# Patient Record
Sex: FEMALE | Race: WHITE | HISPANIC OR LATINO | Employment: UNEMPLOYED | URBAN - METROPOLITAN AREA
[De-identification: names, ages, dates, MRNs, and addresses within clinical notes are randomized per-mention and may not be internally consistent; named-entity substitution may affect disease eponyms.]

---

## 2022-08-10 ENCOUNTER — OFFICE VISIT (OUTPATIENT)
Dept: INTERNAL MEDICINE CLINIC | Facility: CLINIC | Age: 52
End: 2022-08-10
Payer: COMMERCIAL

## 2022-08-10 VITALS
SYSTOLIC BLOOD PRESSURE: 134 MMHG | WEIGHT: 204 LBS | HEIGHT: 62 IN | BODY MASS INDEX: 37.54 KG/M2 | HEART RATE: 88 BPM | OXYGEN SATURATION: 98 % | TEMPERATURE: 98.8 F | DIASTOLIC BLOOD PRESSURE: 80 MMHG | RESPIRATION RATE: 16 BRPM

## 2022-08-10 DIAGNOSIS — Z80.8 FAMILY HX OF MELANOMA: ICD-10-CM

## 2022-08-10 DIAGNOSIS — I10 PRIMARY HYPERTENSION: ICD-10-CM

## 2022-08-10 DIAGNOSIS — Z00.00 ANNUAL PHYSICAL EXAM: Primary | ICD-10-CM

## 2022-08-10 DIAGNOSIS — E78.2 MIXED HYPERLIPIDEMIA: ICD-10-CM

## 2022-08-10 DIAGNOSIS — F41.9 ANXIETY AND DEPRESSION: ICD-10-CM

## 2022-08-10 DIAGNOSIS — F17.200 TOBACCO USE DISORDER: ICD-10-CM

## 2022-08-10 DIAGNOSIS — K21.9 GASTROESOPHAGEAL REFLUX DISEASE, UNSPECIFIED WHETHER ESOPHAGITIS PRESENT: ICD-10-CM

## 2022-08-10 DIAGNOSIS — F32.A ANXIETY AND DEPRESSION: ICD-10-CM

## 2022-08-10 DIAGNOSIS — G54.2 CERVICAL NERVE ROOT IMPINGEMENT: ICD-10-CM

## 2022-08-10 PROCEDURE — 99386 PREV VISIT NEW AGE 40-64: CPT | Performed by: FAMILY MEDICINE

## 2022-08-10 PROCEDURE — 99406 BEHAV CHNG SMOKING 3-10 MIN: CPT | Performed by: FAMILY MEDICINE

## 2022-08-10 RX ORDER — CYCLOBENZAPRINE HCL 5 MG
TABLET ORAL
COMMUNITY
Start: 2022-06-16

## 2022-08-10 RX ORDER — NITROFURANTOIN 25; 75 MG/1; MG/1
100 CAPSULE ORAL EVERY 12 HOURS
COMMUNITY
Start: 2022-08-10 | End: 2022-08-15

## 2022-08-10 RX ORDER — HYDROCHLOROTHIAZIDE 25 MG/1
25 TABLET ORAL DAILY
COMMUNITY
Start: 2022-05-14 | End: 2022-08-10 | Stop reason: SDUPTHER

## 2022-08-10 RX ORDER — SIMVASTATIN 20 MG
20 TABLET ORAL EVERY EVENING
COMMUNITY
Start: 2022-05-14 | End: 2022-08-10 | Stop reason: SDUPTHER

## 2022-08-10 RX ORDER — SIMVASTATIN 20 MG
20 TABLET ORAL EVERY EVENING
Qty: 90 TABLET | Refills: 0 | Status: SHIPPED | OUTPATIENT
Start: 2022-08-10 | End: 2022-11-08

## 2022-08-10 RX ORDER — LOSARTAN POTASSIUM 25 MG/1
25 TABLET ORAL DAILY
COMMUNITY
Start: 2022-05-14 | End: 2022-08-10 | Stop reason: SDUPTHER

## 2022-08-10 RX ORDER — PANTOPRAZOLE SODIUM 40 MG/1
40 TABLET, DELAYED RELEASE ORAL DAILY
Qty: 90 TABLET | Refills: 0 | Status: SHIPPED | OUTPATIENT
Start: 2022-08-10 | End: 2022-11-08

## 2022-08-10 RX ORDER — HYDROCHLOROTHIAZIDE 25 MG/1
25 TABLET ORAL DAILY
Qty: 90 TABLET | Refills: 0 | Status: SHIPPED | OUTPATIENT
Start: 2022-08-10 | End: 2022-11-08

## 2022-08-10 RX ORDER — PANTOPRAZOLE SODIUM 40 MG/1
40 TABLET, DELAYED RELEASE ORAL DAILY
COMMUNITY
Start: 2022-05-14 | End: 2022-08-10 | Stop reason: SDUPTHER

## 2022-08-10 RX ORDER — LOSARTAN POTASSIUM 25 MG/1
25 TABLET ORAL DAILY
Qty: 90 TABLET | Refills: 0 | Status: SHIPPED | OUTPATIENT
Start: 2022-08-10 | End: 2022-11-08

## 2022-08-10 RX ORDER — ESCITALOPRAM OXALATE 20 MG/1
20 TABLET ORAL DAILY
COMMUNITY
Start: 2022-05-14 | End: 2022-08-10 | Stop reason: SDUPTHER

## 2022-08-10 RX ORDER — GABAPENTIN 300 MG/1
300 CAPSULE ORAL
Qty: 4 CAPSULE | Refills: 0 | Status: SHIPPED | OUTPATIENT
Start: 2022-08-10

## 2022-08-10 RX ORDER — ESTRADIOL 2 MG/1
2 TABLET ORAL DAILY
COMMUNITY
Start: 2022-05-14

## 2022-08-10 RX ORDER — ESCITALOPRAM OXALATE 20 MG/1
20 TABLET ORAL DAILY
Qty: 90 TABLET | Refills: 0 | Status: SHIPPED | OUTPATIENT
Start: 2022-08-10 | End: 2022-11-08

## 2022-08-10 NOTE — PATIENT INSTRUCTIONS

## 2022-08-10 NOTE — PROGRESS NOTES
ADULT ANNUAL [address]     NAME: Vlad Lopez  AGE: 46 y o  SEX: female  : 1970     DATE: 8/10/2022     Assessment and Plan:     Problem List Items Addressed This Visit        Digestive    Gastroesophageal reflux disease    Relevant Medications    pantoprazole (PROTONIX) 40 mg tablet       Cardiovascular and Mediastinum    Primary hypertension    Relevant Medications    hydrochlorothiazide (HYDRODIURIL) 25 mg tablet    losartan (COZAAR) 25 mg tablet       Nervous and Auditory    Cervical nerve root impingement    Relevant Medications    gabapentin (Neurontin) 300 mg capsule       Other    Anxiety and depression    Relevant Medications    escitalopram (LEXAPRO) 20 mg tablet    Mixed hyperlipidemia    Relevant Medications    simvastatin (ZOCOR) 20 mg tablet    Tobacco use disorder      Other Visit Diagnoses     Annual physical exam    -  Primary    Family hx of melanoma        Relevant Orders    Ambulatory Referral to Dermatology      47 yo female with cervical nerve impingment  Has upcoming surgery  Has had labs completed  Pt will forward to this office  Never tried analgesia for neuropathic pain  Was taking excessive NSAIDS  Will trial gabapentin for a few days  Refills of medications provided  After recovering form surgery pt will follow up to discuss mammograms and colon cancer screening  Will establish with derm after surgery for annual skin cancer screening  Immunizations and preventive care screenings were discussed with patient today  Appropriate education was printed on patient's after visit summary  Counseling:  Dental Health: discussed importance of regular tooth brushing, flossing, and dental visits  Injury prevention: discussed safety/seat belts, safety helmets, smoke detectors, carbon dioxide detectors, and smoking near bedding or upholstery    · Exercise: the importance of regular exercise/physical activity was discussed  Recommend exercise 3-5 times per week for at least 30 minutes  BMI Counseling: Body mass index is 37 31 kg/m²  The BMI is above normal  Nutrition recommendations include encouraging healthy choices of fruits and vegetables, limiting drinks that contain sugar and reducing intake of cholesterol  Exercise recommendations include exercising 3-5 times per week  Rationale for BMI follow-up plan is due to patient being overweight or obese  Depression Screening and Follow-up Plan: Patient was screened for depression during today's encounter  They screened negative with a PHQ-2 score of 2  Tobacco Cessation Counseling: Tobacco cessation counseling was provided  The patient is sincerely urged to quit consumption of tobacco  She is ready to quit tobacco  Pt would like to continue slow weaning without medication  3 minutes of encounter spent providing smoking cessation counseling  Return in about 6 months (around 2/10/2023) for Next scheduled follow up  Chief Complaint:     Chief Complaint   Patient presents with    Establish Care      History of Present Illness:     Adult Annual Physical   Patient here for a comprehensive physical exam   Last seen previous PCP in [place] a few year k[place]  Has cervical radiculopathy  Is scheduled for surgery this upcoming Monday  Hx of hld, htn, anxiety and depression and GERD  Works on a factory line  But currently out on workman's comp  Lives with significant other    Had blood work done few days ago  1st cousin with breast cancer  Denies family of colon cancer   Currently daily smoker  >30 years  Cutting down to a quarter of a pack a day  Diet and Physical Activity  · Diet/Nutrition: limited junk food  · Exercise: walking        Depression Screening  PHQ-2/9 Depression Screening    Little interest or pleasure in doing things: 0 - not at all  Feeling down, depressed, or hopeless: 2 - more than half the days  PHQ-2 Score: 2  PHQ-2 Interpretation: Negative depression screen       General Health  · Sleep: gets 4-6 hours of sleep on average  · Hearing: normal - bilateral   · Vision: wears glasses  · Dental: regular dental visits  /GYN Health  · Patient is: postmenopausal  · LAST PAP- YEARS  · LAST COLO-YEARS   · LAST MAMMO     Review of Systems:     Review of Systems   Constitutional: Negative for fever  Respiratory: Negative for shortness of breath  Cardiovascular: Negative for chest pain  Gastrointestinal: Positive for constipation  Negative for diarrhea  Genitourinary: Negative for difficulty urinating  Musculoskeletal: Positive for neck pain  Neurological: Positive for weakness (upper ext b/l ) and headaches (tension headache )  Psychiatric/Behavioral: The patient is nervous/anxious  Past Medical History:     Past Medical History:   Diagnosis Date    Anxiety     Depression     GERD (gastroesophageal reflux disease)     Hypertension     Urinary tract infection       Past Surgical History:     Past Surgical History:   Procedure Laterality Date     SECTION      FRACTURE SURGERY      HYSTERECTOMY        Social History:     Social History     Socioeconomic History    Marital status:       Spouse name: None    Number of children: None    Years of education: None    Highest education level: None   Occupational History    None   Tobacco Use    Smoking status: Current Every Day Smoker     Packs/day: 0 25     Years: 30 00     Pack years: 7 50     Types: Cigarettes    Smokeless tobacco: Never Used   Substance and Sexual Activity    Alcohol use: Not Currently    Drug use: None    Sexual activity: None   Other Topics Concern    None   Social History Narrative    None     Social Determinants of Health     Financial Resource Strain: Not on file   Food Insecurity: Not on file   Transportation Needs: Not on file   Physical Activity: Unknown    Days of Exercise per Week: 0 days   [employer] Exercise per Session: Not on file   Stress: Not on file   Social Connections: Not on file   Intimate Partner Violence: Not on file   Housing Stability: Not on file      Family History:     Family History   Problem Relation Age of Onset    Heart disease Mother     Cervical cancer Mother     Diabetes type II Father     Cancer Father     Hypertension Brother     Hyperlipidemia Brother       Current Medications:     Current Outpatient Medications   Medication Sig Dispense Refill    escitalopram (LEXAPRO) 20 mg tablet Take 1 tablet (20 mg total) by mouth daily As directed 90 tablet 0    gabapentin (Neurontin) 300 mg capsule Take 1 capsule (300 mg total) by mouth daily at bedtime 4 capsule 0    hydrochlorothiazide (HYDRODIURIL) 25 mg tablet Take 1 tablet (25 mg total) by mouth daily 90 tablet 0    losartan (COZAAR) 25 mg tablet Take 1 tablet (25 mg total) by mouth daily 90 tablet 0    nitrofurantoin (MACROBID) 100 mg capsule Take 100 mg by mouth every 12 (twelve) hours      pantoprazole (PROTONIX) 40 mg tablet Take 1 tablet (40 mg total) by mouth daily 90 tablet 0    simvastatin (ZOCOR) 20 mg tablet Take 1 tablet (20 mg total) by mouth every evening 90 tablet 0    cyclobenzaprine (FLEXERIL) 5 mg tablet TAKE 1 TAB EVERY 8 HOURS AS NEEDED FOR MUSCLE SPASMS      estradiol (ESTRACE) 2 MG tablet Take 2 mg by mouth daily       No current facility-administered medications for this visit  Allergies:     No Known Allergies   Physical Exam:     /80 (BP Location: Left arm, Patient Position: Sitting, Cuff Size: Large)   Pulse 88   Temp 98 8 °F (37 1 °C) (Tympanic)   Resp 16   Ht 5' 2" (1 575 m)   Wt 92 5 kg (204 lb)   SpO2 98%   BMI 37 31 kg/m²     Physical Exam  HENT:      Head: Normocephalic and atraumatic        Right Ear: Tympanic membrane and external ear normal       Left Ear: Tympanic membrane and external ear normal       Mouth/Throat:      Mouth: Mucous membranes are moist       Pharynx: Oropharynx is clear  Eyes:      Extraocular Movements: Extraocular movements intact  Conjunctiva/sclera: Conjunctivae normal       Pupils: Pupils are equal, round, and reactive to light  Cardiovascular:      Rate and Rhythm: Normal rate and regular rhythm  Heart sounds: No murmur heard  Pulmonary:      Effort: Pulmonary effort is normal       Breath sounds: Normal breath sounds  Abdominal:      General: Bowel sounds are normal       Palpations: Abdomen is soft  Musculoskeletal:      Right shoulder: No bony tenderness  Decreased range of motion  Decreased strength  Left shoulder: No bony tenderness  Decreased range of motion  Decreased strength  Neurological:      Mental Status: She is alert and oriented to person, place, and time        Gait: Gait normal    Psychiatric:         Mood and Affect: Mood normal          Behavior: Behavior normal           Zoila Holm,   MEDICAL [address]

## 2022-11-21 DIAGNOSIS — I10 PRIMARY HYPERTENSION: ICD-10-CM

## 2022-11-21 RX ORDER — LOSARTAN POTASSIUM 25 MG/1
TABLET ORAL
Qty: 90 TABLET | Refills: 0 | Status: SHIPPED | OUTPATIENT
Start: 2022-11-21

## 2022-11-22 ENCOUNTER — OFFICE VISIT (OUTPATIENT)
Dept: INTERNAL MEDICINE CLINIC | Facility: CLINIC | Age: 52
End: 2022-11-22

## 2022-11-22 VITALS
SYSTOLIC BLOOD PRESSURE: 110 MMHG | WEIGHT: 216.2 LBS | BODY MASS INDEX: 39.54 KG/M2 | RESPIRATION RATE: 18 BRPM | DIASTOLIC BLOOD PRESSURE: 62 MMHG | TEMPERATURE: 97.6 F | HEART RATE: 86 BPM | OXYGEN SATURATION: 97 %

## 2022-11-22 DIAGNOSIS — Z12.11 COLON CANCER SCREENING: ICD-10-CM

## 2022-11-22 DIAGNOSIS — G54.2 CERVICAL NERVE ROOT IMPINGEMENT: Primary | ICD-10-CM

## 2022-11-22 DIAGNOSIS — R30.0 DYSURIA: ICD-10-CM

## 2022-11-22 DIAGNOSIS — R53.83 LOW ENERGY: ICD-10-CM

## 2022-11-22 DIAGNOSIS — E78.5 DYSLIPIDEMIA: ICD-10-CM

## 2022-11-22 DIAGNOSIS — Z98.890 H/O CERVICAL SPINE SURGERY: ICD-10-CM

## 2022-11-22 DIAGNOSIS — Z12.31 ENCOUNTER FOR SCREENING MAMMOGRAM FOR BREAST CANCER: ICD-10-CM

## 2022-11-22 DIAGNOSIS — Z11.59 NEED FOR HEPATITIS C SCREENING TEST: ICD-10-CM

## 2022-11-22 DIAGNOSIS — K21.9 GASTROESOPHAGEAL REFLUX DISEASE, UNSPECIFIED WHETHER ESOPHAGITIS PRESENT: ICD-10-CM

## 2022-11-22 RX ORDER — OMEPRAZOLE 40 MG/1
40 CAPSULE, DELAYED RELEASE ORAL DAILY
Qty: 90 CAPSULE | Refills: 0 | Status: SHIPPED | OUTPATIENT
Start: 2022-11-22

## 2022-11-22 NOTE — PROGRESS NOTES
Name: Stephanie Kincaid      : 1970      MRN: [id number]  Encounter Provider: Enmanuel Youssef DO  Encounter Date: 2022   Encounter department: [facility]  [address]     1  Cervical nerve root impingement  2  H/O cervical spine surgery-pt to continue to follow with surgeon and start PT  3  Encounter for screening mammogram for breast cancer  -     Mammo screening bilateral w 3d & cad; Future; Expected date: 2022    4  Colon cancer screening  -     Ambulatory Referral to Gastroenterology; Future    5  Gastroesophageal reflux disease, unspecified whether esophagitis present-refractory to omeprazole 20mg  Will increase to 40mg and pt to continue tums prn  Will f/u with GI for possible EDG due to refractory symptoms    -     Ambulatory Referral to Gastroenterology; Future  -     omeprazole (PriLOSEC) 40 MG capsule; Take 1 capsule (40 mg total) by mouth daily    6  Dysuria  -     UA w Reflex to Microscopic w Reflex to Culture -Lab Collect; Future; Expected date: 2022    7  Low energy  -     Vitamin D 25 hydroxy; Future    8  Dyslipidemia- on zocor  Pt to continue   -     Lipid Panel with Direct LDL reflex; Future    9  Need for hepatitis C screening test  -     Hepatitis C Antibody ([facility]); Future           Subjective      gerd refractory to ppi  Seen in er for chest pain  ACS and PE were ruled out  Since pt has been using lots of tums which provides moderate, temporary relief  Admits to poor diet  especially since cervical spine surgery and since quitting tobacco use  Has followed up with neurosurgeon today  Next step is to participate  in PT  Still has pain and radiating     Review of Systems   Constitutional: Negative for fatigue  Respiratory: Negative for shortness of breath  Cardiovascular: Negative for chest pain  Gastrointestinal: Positive for abdominal pain  Musculoskeletal: Positive for neck stiffness   Negative for gait problem  Neurological: Positive for numbness  Current Outpatient Medications on File Prior to Visit   Medication Sig   • escitalopram (LEXAPRO) 20 mg tablet Take 1 tablet (20 mg total) by mouth daily As directed   • estradiol (ESTRACE) 2 MG tablet Take 2 mg by mouth daily   • hydrochlorothiazide (HYDRODIURIL) 25 mg tablet Take 1 tablet (25 mg total) by mouth daily   • losartan (COZAAR) 25 mg tablet TAKE 1 TABLET BY MOUTH EVERY DAY   • simvastatin (ZOCOR) 20 mg tablet Take 1 tablet (20 mg total) by mouth every evening   • cyclobenzaprine (FLEXERIL) 5 mg tablet TAKE 1 TAB EVERY 8 HOURS AS NEEDED FOR MUSCLE SPASMS (Patient not taking: Reported on 11/22/2022)   • gabapentin (Neurontin) 300 mg capsule Take 1 capsule (300 mg total) by mouth daily at bedtime (Patient not taking: Reported on 11/22/2022)       Objective     /62 (BP Location: Left arm, Patient Position: Sitting, Cuff Size: Standard)   Pulse 86   Temp 97 6 °F (36 4 °C) (Temporal)   Resp 18   Wt 98 1 kg (216 lb 3 2 oz)   SpO2 97%   BMI 39 54 kg/m²     Physical Exam  HENT:      Head: Normocephalic and atraumatic  Cardiovascular:      Rate and Rhythm: Normal rate and regular rhythm  Heart sounds: No murmur heard  Pulmonary:      Effort: Pulmonary effort is normal       Breath sounds: Normal breath sounds  Abdominal:      General: Bowel sounds are normal       Palpations: Abdomen is soft  Neurological:      Mental Status: She is alert and oriented to person, place, and time         Tim Burt DO

## 2022-11-22 NOTE — PATIENT INSTRUCTIONS
Make an appointment with the gastroenterologist    Start taking omeprazole and linden for the heartburn   Schedule your mammogram   Get your blood work completed

## 2023-02-05 DIAGNOSIS — I10 PRIMARY HYPERTENSION: ICD-10-CM

## 2023-02-05 RX ORDER — HYDROCHLOROTHIAZIDE 25 MG/1
25 TABLET ORAL DAILY
Qty: 90 TABLET | Refills: 0 | Status: SHIPPED | OUTPATIENT
Start: 2023-02-05 | End: 2023-05-06

## 2023-03-01 DIAGNOSIS — I10 PRIMARY HYPERTENSION: ICD-10-CM

## 2023-03-01 RX ORDER — LOSARTAN POTASSIUM 25 MG/1
TABLET ORAL
Qty: 90 TABLET | Refills: 0 | Status: SHIPPED | OUTPATIENT
Start: 2023-03-01

## 2023-03-02 DIAGNOSIS — K21.9 GASTROESOPHAGEAL REFLUX DISEASE, UNSPECIFIED WHETHER ESOPHAGITIS PRESENT: ICD-10-CM

## 2023-03-02 RX ORDER — OMEPRAZOLE 40 MG/1
40 CAPSULE, DELAYED RELEASE ORAL DAILY
Qty: 90 CAPSULE | Refills: 0 | Status: SHIPPED | OUTPATIENT
Start: 2023-03-02

## 2023-03-18 DIAGNOSIS — K21.9 GASTROESOPHAGEAL REFLUX DISEASE, UNSPECIFIED WHETHER ESOPHAGITIS PRESENT: ICD-10-CM

## 2023-03-18 DIAGNOSIS — I10 PRIMARY HYPERTENSION: ICD-10-CM

## 2023-03-18 RX ORDER — OMEPRAZOLE 40 MG/1
40 CAPSULE, DELAYED RELEASE ORAL DAILY
Qty: 90 CAPSULE | Refills: 0 | Status: SHIPPED | OUTPATIENT
Start: 2023-03-18

## 2023-03-18 RX ORDER — LOSARTAN POTASSIUM 25 MG/1
TABLET ORAL
Qty: 90 TABLET | Refills: 0 | Status: SHIPPED | OUTPATIENT
Start: 2023-03-18

## 2023-03-25 DIAGNOSIS — E78.2 MIXED HYPERLIPIDEMIA: ICD-10-CM

## 2023-03-25 RX ORDER — SIMVASTATIN 20 MG
TABLET ORAL
Qty: 90 TABLET | Refills: 1 | Status: SHIPPED | OUTPATIENT
Start: 2023-03-25

## 2023-04-03 ENCOUNTER — OFFICE VISIT (OUTPATIENT)
Dept: FAMILY MEDICINE CLINIC | Facility: CLINIC | Age: 53
End: 2023-04-03

## 2023-04-03 VITALS
SYSTOLIC BLOOD PRESSURE: 130 MMHG | BODY MASS INDEX: 39.75 KG/M2 | HEART RATE: 74 BPM | OXYGEN SATURATION: 98 % | WEIGHT: 216 LBS | DIASTOLIC BLOOD PRESSURE: 88 MMHG | TEMPERATURE: 98 F | HEIGHT: 62 IN

## 2023-04-03 DIAGNOSIS — Z12.11 COLON CANCER SCREENING: ICD-10-CM

## 2023-04-03 DIAGNOSIS — Z12.31 ENCOUNTER FOR SCREENING MAMMOGRAM FOR BREAST CANCER: ICD-10-CM

## 2023-04-03 DIAGNOSIS — Z76.89 ENCOUNTER TO ESTABLISH CARE WITH NEW DOCTOR: Primary | ICD-10-CM

## 2023-04-03 DIAGNOSIS — K21.9 GASTROESOPHAGEAL REFLUX DISEASE, UNSPECIFIED WHETHER ESOPHAGITIS PRESENT: ICD-10-CM

## 2023-04-03 DIAGNOSIS — R63.5 WEIGHT GAIN: ICD-10-CM

## 2023-04-03 DIAGNOSIS — G89.29 CHRONIC PAIN OF RIGHT KNEE: ICD-10-CM

## 2023-04-03 DIAGNOSIS — E78.2 MIXED HYPERLIPIDEMIA: ICD-10-CM

## 2023-04-03 DIAGNOSIS — R10.2 PELVIC PAIN: ICD-10-CM

## 2023-04-03 DIAGNOSIS — M25.561 CHRONIC PAIN OF RIGHT KNEE: ICD-10-CM

## 2023-04-03 DIAGNOSIS — F43.81 GRIEF REACTION WITH PROLONGED BEREAVEMENT: ICD-10-CM

## 2023-04-03 RX ORDER — FAMOTIDINE 20 MG/1
20 TABLET, FILM COATED ORAL 2 TIMES DAILY
Qty: 60 TABLET | Refills: 2 | Status: SHIPPED | OUTPATIENT
Start: 2023-04-03
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
## 2023-04-10 PROBLEM — G37.9 DEMYELINATING LESION (HCC): Status: ACTIVE | Noted: 2023-04-10

## 2023-04-10 PROBLEM — E66.01 MORBID OBESITY WITH BMI OF 40.0-44.9, ADULT (HCC): Status: ACTIVE | Noted: 2023-04-10

## 2023-04-10 PROBLEM — R26.2 AMBULATORY DYSFUNCTION: Status: ACTIVE | Noted: 2023-04-10

## 2023-04-10 PROBLEM — E87.6 HYPOKALEMIA: Status: ACTIVE | Noted: 2023-04-10

## 2023-04-10 PROBLEM — R42 DIZZINESS: Status: ACTIVE | Noted: 2023-04-10

## 2023-04-28 DIAGNOSIS — K21.9 GASTROESOPHAGEAL REFLUX DISEASE, UNSPECIFIED WHETHER ESOPHAGITIS PRESENT: ICD-10-CM

## 2023-04-28 RX ORDER — FAMOTIDINE 20 MG/1
TABLET, FILM COATED ORAL
Qty: 180 TABLET | Refills: 1 | Status: SHIPPED | OUTPATIENT
Start: 2023-04-28

## 2023-05-12 ENCOUNTER — TELEPHONE (OUTPATIENT)
Dept: NEUROLOGY | Facility: CLINIC | Age: 53
End: 2023-05-12
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
## 2023-06-24 DIAGNOSIS — I10 PRIMARY HYPERTENSION: ICD-10-CM

## 2023-06-24 RX ORDER — HYDROCHLOROTHIAZIDE 25 MG/1
25 TABLET ORAL DAILY
Qty: 30 TABLET | Refills: 0 | Status: SHIPPED | OUTPATIENT
Start: 2023-06-24 | End: 2023-09-22

## 2023-06-28 ENCOUNTER — HOSPITAL ENCOUNTER (OUTPATIENT)
Dept: RADIOLOGY | Facility: HOSPITAL | Age: 53
Discharge: HOME | End: 2023-06-28
Payer: COMMERCIAL

## 2023-06-28 ENCOUNTER — OFFICE VISIT (OUTPATIENT)
Dept: FAMILY MEDICINE CLINIC | Facility: CLINIC | Age: 53
End: 2023-06-28
Payer: COMMERCIAL

## 2023-06-28 VITALS
OXYGEN SATURATION: 98 % | WEIGHT: 203 LBS | DIASTOLIC BLOOD PRESSURE: 78 MMHG | HEIGHT: 61 IN | TEMPERATURE: 98.7 F | HEART RATE: 91 BPM | BODY MASS INDEX: 38.33 KG/M2 | SYSTOLIC BLOOD PRESSURE: 122 MMHG

## 2023-06-28 DIAGNOSIS — M54.42 ACUTE BILATERAL LOW BACK PAIN WITH LEFT-SIDED SCIATICA: Primary | ICD-10-CM

## 2023-06-28 DIAGNOSIS — G89.29 CHRONIC PAIN OF RIGHT KNEE: ICD-10-CM

## 2023-06-28 DIAGNOSIS — M25.561 CHRONIC PAIN OF RIGHT KNEE: ICD-10-CM

## 2023-06-28 DIAGNOSIS — R82.90 FOUL SMELLING URINE: ICD-10-CM

## 2023-06-28 DIAGNOSIS — M54.42 ACUTE BILATERAL LOW BACK PAIN WITH LEFT-SIDED SCIATICA: ICD-10-CM

## 2023-06-28 DIAGNOSIS — M62.838 MUSCLE SPASM: ICD-10-CM

## 2023-06-28 LAB
SL AMB  POCT GLUCOSE, UA: ABNORMAL
SL AMB LEUKOCYTE ESTERASE,UA: ABNORMAL
SL AMB POCT BILIRUBIN,UA: ABNORMAL
SL AMB POCT BLOOD,UA: ABNORMAL
SL AMB POCT CLARITY,UA: CLEAR
SL AMB POCT COLOR,UA: YELLOW
SL AMB POCT KETONES,UA: 0.5
SL AMB POCT NITRITE,UA: ABNORMAL
SL AMB POCT PH,UA: 5
SL AMB POCT SPECIFIC GRAVITY,UA: 1.03
SL AMB POCT URINE PROTEIN: 0.15
SL AMB POCT UROBILINOGEN: 0.2

## 2023-06-28 PROCEDURE — 72110 X-RAY EXAM L-2 SPINE 4/>VWS: CPT

## 2023-06-28 PROCEDURE — 81002 URINALYSIS NONAUTO W/O SCOPE: CPT | Performed by: FAMILY MEDICINE

## 2023-06-28 PROCEDURE — 99214 OFFICE O/P EST MOD 30 MIN: CPT | Performed by: FAMILY MEDICINE

## 2023-06-28 PROCEDURE — 73562 X-RAY EXAM OF KNEE 3: CPT

## 2023-06-28 RX ORDER — METHYLPREDNISOLONE 4 MG/1
TABLET ORAL
Qty: 21 EACH | Refills: 0 | Status: SHIPPED | OUTPATIENT
Start: 2023-06-28

## 2023-06-28 RX ORDER — CYCLOBENZAPRINE HCL 5 MG
5 TABLET ORAL 3 TIMES DAILY PRN
Qty: 15 TABLET | Refills: 0 | Status: SHIPPED | OUTPATIENT
Start: 2023-06-28

## 2023-06-28 NOTE — PROGRESS NOTES
Name: Elizabeth Kim      : 1970      MRN: [id number]  Encounter Provider: Alejandra Hanson DO  Encounter Date: 2023   Encounter department: [facility] [address]     1  Acute bilateral low back pain with left-sided sciatica  -     XR spine lumbar minimum 4 views non injury; Future; Expected date: 2023  -     methylPREDNISolone 4 MG tablet therapy pack; Use as directed on package  -     cyclobenzaprine (FLEXERIL) 5 mg tablet; Take 1 tablet (5 mg total) by mouth 3 (three) times a day as needed for muscle spasms    2  Muscle spasm  -     methylPREDNISolone 4 MG tablet therapy pack; Use as directed on package  -     cyclobenzaprine (FLEXERIL) 5 mg tablet; Take 1 tablet (5 mg total) by mouth 3 (three) times a day as needed for muscle spasms    3  Foul smelling urine  -     POCT urine dip    Urine dip normal, discussed adequate hydration as patient drinks little water daily  Subjective      HPI     Patient presents to the office for back pain  Reports that this first started a few weeks ago, worse in the last few days  Left side of her lower back, down into buttock, groin and hip  Notes numbness and tingling down the leg  Was out of work today and yesterday  Denies injury to the back  Has been taking Motrin and this seems to effect her bowels  Has caused constipation previously  Leaning forward helps the lower back, leaning back makes symptoms worse  Notes that she has had some constipation, took Miralax and this has helped  Had 3 BMs today  Denies blood in the stool  Denies urinary frequency or urgency  Notes that her urinary smell strong  Denies dysuria  Review of Systems    Current Outpatient Medications on File Prior to Visit   Medication Sig   • famotidine (PEPCID) 20 mg tablet TAKE 1 TABLET BY MOUTH TWICE A DAY   • hydrochlorothiazide (HYDRODIURIL) 25 mg tablet TAKE 1 TABLET (25 MG TOTAL) BY MOUTH DAILY     • losartan "(COZAAR) 25 mg tablet TAKE 1 TABLET BY MOUTH EVERY DAY   • simvastatin (ZOCOR) 20 mg tablet TAKE 1 TABLET BY MOUTH EVERY DAY IN THE EVENING   • escitalopram (LEXAPRO) 20 mg tablet Take 1 tablet (20 mg total) by mouth daily As directed   • estradiol (ESTRACE) 2 MG tablet TAKE 1 TABLET BY MOUTH EVERY DAY (Patient not taking: Reported on 6/28/2023)   • gabapentin (Neurontin) 300 mg capsule Take 1 capsule (300 mg total) by mouth daily at bedtime (Patient not taking: Reported on 6/28/2023)   • meclizine (ANTIVERT) 25 mg tablet Take 1 tablet (25 mg total) by mouth 3 (three) times a day as needed for dizziness (Patient not taking: Reported on 6/28/2023)   • [DISCONTINUED] cyclobenzaprine (FLEXERIL) 5 mg tablet Take 1 tablet (5 mg total) by mouth 3 (three) times a day as needed for muscle spasms     Objective     /78 (BP Location: Left arm, Patient Position: Sitting, Cuff Size: Large)   Pulse 91   Temp 98 7 °F (37 1 °C)   Ht 5' 1\" (1 549 m)   Wt 92 1 kg (203 lb)   SpO2 98%   BMI 38 36 kg/m²     Physical Exam  Vitals reviewed  Constitutional:       General: She is not in acute distress  Appearance: Normal appearance  HENT:      Head: Normocephalic and atraumatic  Right Ear: External ear normal       Left Ear: External ear normal       Nose: Nose normal       Mouth/Throat:      Mouth: Mucous membranes are moist    Eyes:      Extraocular Movements: Extraocular movements intact  Conjunctiva/sclera: Conjunctivae normal       Pupils: Pupils are equal, round, and reactive to light  Cardiovascular:      Rate and Rhythm: Normal rate and regular rhythm  Pulmonary:      Effort: Pulmonary effort is normal       Breath sounds: Normal breath sounds  No wheezing, rhonchi or rales  Abdominal:      General: Bowel sounds are normal  There is no distension  Palpations: Abdomen is soft  Tenderness: There is no abdominal tenderness  Musculoskeletal:      Thoracic back: Spasms present        Lumbar " back: Spasms present  No swelling or bony tenderness  Decreased range of motion  Positive left straight leg raise test  Negative right straight leg raise test       Right lower leg: No edema  Left lower leg: No edema  Skin:     General: Skin is warm  Capillary Refill: Capillary refill takes less than 2 seconds  Findings: No rash  Neurological:      Mental Status: She is alert  Mental status is at baseline          Amnia Harrell DO

## 2023-07-19 ENCOUNTER — OFFICE VISIT (OUTPATIENT)
Dept: GASTROENTEROLOGY | Facility: CLINIC | Age: 53
End: 2023-07-19
Payer: COMMERCIAL

## 2023-07-19 VITALS
HEART RATE: 93 BPM | OXYGEN SATURATION: 98 % | WEIGHT: 204 LBS | HEIGHT: 61 IN | BODY MASS INDEX: 38.51 KG/M2 | DIASTOLIC BLOOD PRESSURE: 70 MMHG | SYSTOLIC BLOOD PRESSURE: 128 MMHG

## 2023-07-19 DIAGNOSIS — K21.9 GASTROESOPHAGEAL REFLUX DISEASE, UNSPECIFIED WHETHER ESOPHAGITIS PRESENT: ICD-10-CM

## 2023-07-19 DIAGNOSIS — Z12.11 COLON CANCER SCREENING: ICD-10-CM

## 2023-07-19 PROCEDURE — 99204 OFFICE O/P NEW MOD 45 MIN: CPT | Performed by: PHYSICIAN ASSISTANT

## 2023-07-19 NOTE — PROGRESS NOTES
[facility] - Outpatient Consultation  [facility] 45 y.o. female MRN: [id number]  Encounter: [id number]          ASSESSMENT AND PLAN:      1. Colon cancer screening    Patient presents to schedule a colonoscopy. No family history of colon cancer. Will plan for colonoscopy to investigate. Recommend Benefiber daily for her constipation. 2. Gastroesophageal reflux disease    She also reports a long history of GERD for many years. She has never had an EGD. Will plan for EGD at the same time as the colonoscopy to screen for Cobian's. She is currently on Pepcid with relief of her symptoms. GERD modifications. ______________________________________________________________________    HPI:  Patient is a pleasant 45year old female with a PMH of HTN, depression, anxiety who presents to the office to schedule a colonoscopy and EGD. Patient reports a long history of GERD for many years. She is currently on Pepcid with relief of her reflux symptoms. She reports a history of IBS-C for years. She also reports a history of hemorrhoids and rectal bleeding. No family history of esophageal, stomach, or colon cancer. REVIEW OF SYSTEMS:    CONSTITUTIONAL: Denies any fever, chills, rigors, and weight loss. HEENT: No earache or tinnitus. Denies hearing loss or visual disturbances. CARDIOVASCULAR: No chest pain or palpitations. RESPIRATORY: Denies any cough, hemoptysis, shortness of breath or dyspnea on exertion. GASTROINTESTINAL: As noted in the History of Present Illness. GENITOURINARY: No problems with urination. Denies any hematuria or dysuria. NEUROLOGIC: No dizziness or vertigo, denies headaches. MUSCULOSKELETAL: Denies any muscle or joint pain. SKIN: Denies skin rashes or itching. ENDOCRINE: Denies excessive thirst. Denies intolerance to heat or cold. PSYCHOSOCIAL: Denies depression or anxiety. Denies any recent memory loss.        Historical Information   Past Medical History:   Diagnosis Date   • Anxiety    • Depression    • GERD (gastroesophageal reflux disease)    • Hypertension    • Urinary tract infection      Past Surgical History:   Procedure Laterality Date   •  SECTION     • FRACTURE SURGERY     • HYSTERECTOMY       Social History   Social History     Substance and Sexual Activity   Alcohol Use Not Currently     Social History     Substance and Sexual Activity   Drug Use Not on file    Comment: medical     Social History     Tobacco Use   Smoking Status Former   • Packs/day: 0.25   • Years: 30.00   • Total pack years: 7.50   • Types: Cigarettes   • Start date: 16   • Quit date: 2022   • Years since quittin.9   Smokeless Tobacco Never     Family History   Problem Relation Age of Onset   • Heart disease Mother    • Cervical cancer Mother    • Diabetes type II Father    • Cancer Father    • Hypertension Brother    • Hyperlipidemia Brother        Meds/Allergies       Current Outpatient Medications:   •  cyclobenzaprine (FLEXERIL) 5 mg tablet  •  famotidine (PEPCID) 20 mg tablet  •  hydrochlorothiazide (HYDRODIURIL) 25 mg tablet  •  losartan (COZAAR) 25 mg tablet  •  methylPREDNISolone 4 MG tablet therapy pack  •  simvastatin (ZOCOR) 20 mg tablet  •  escitalopram (LEXAPRO) 20 mg tablet  •  estradiol (ESTRACE) 2 MG tablet  •  gabapentin (Neurontin) 300 mg capsule  •  meclizine (ANTIVERT) 25 mg tablet    No Known Allergies        Objective     Blood pressure 128/70, pulse 93, height 5' 1" (1.549 m), weight 92.5 kg (204 lb), SpO2 98 %. Body mass index is 38.55 kg/m². PHYSICAL EXAM:      General Appearance:   Alert, cooperative, no distress   HEENT:   Normocephalic, atraumatic, anicteric.     Neck:  Supple, symmetrical, trachea midline   Lungs:   Clear to auscultation bilaterally; no rales, rhonchi or wheezing; respirations unlabored    Heart[de-identified]   Regular rate and rhythm; no murmur, rub, or gallop.    Abdomen:   Soft, non-tender, non-distended; normal bowel sounds; no masses, no organomegaly    Genitalia:   Deferred    Rectal:   Deferred    Extremities:  No cyanosis, clubbing or edema    Pulses:  2+ and symmetric    Skin:  No jaundice, rashes, or lesions    Lymph nodes:  No palpable cervical lymphadenopathy        Lab Results:   No visits with results within 1 Day(s) from this visit. Latest known visit with results is:   Office Visit on 06/28/2023   Component Date Value   • LEUKOCYTE ESTERASE,UA 06/28/2023 NEG    • NITRITE,UA 06/28/2023 NEG    • SL AMB POCT UROBILINOGEN 06/28/2023 0.2    • POCT URINE PROTEIN 06/28/2023 0.15    •  PH,UA 06/28/2023 5.0    • BLOOD,UA 06/28/2023 NEG    • SPECIFIC GRAVITY,UA 06/28/2023 1.030    • KETONES,UA 06/28/2023 0.5    • BILIRUBIN,UA 06/28/2023 NEG    • GLUCOSE, UA 06/28/2023 NEG    •  COLOR,UA 06/28/2023 YELLOW    • CLARITY,UA 06/28/2023 CLEAR          Radiology Results:   XR spine lumbar minimum 4 views non injury    Result Date: 7/7/2023  Narrative: LUMBAR SPINE INDICATION:   M54.42: Lumbago with sciatica, left side. COMPARISON:  None VIEWS:  XR SPINE LUMBAR MINIMUM 4 VIEWS NON INJURY FINDINGS: There are 5 non rib bearing lumbar vertebral bodies. There is no evidence of acute fracture or destructive osseous lesion. Alignment is unremarkable. Moderate L1-L2 and L2-L3 disc space narrowing, subchondral sclerosis, and endplate spondylosis, consistent with degenerative disc disease. Moderate lower lumbar facet arthropathy. The pedicles appear intact. Soft tissues are unremarkable. Impression: No acute osseous abnormality. Degenerative changes as described. Workstation performed: [id number]     XR knee 3 vw right non injury    Result Date: 7/7/2023  Narrative: RIGHT KNEE INDICATION:   M25.561: Pain in right knee G89.29: Other chronic pain.  COMPARISON:  None VIEWS:  XR KNEE 3 VW RIGHT NON INJURY FINDINGS: Incompletely imaged tibial intramedullary kate with proximal interlocking screw, the superior aspect of the kate is at the level of the articular surface of the knee. There is no acute fracture or dislocation. There is no joint effusion. Mild tricompartmental osteoarthritis with joint space narrowing and small osteophytes. No lytic or blastic osseous lesion. Soft tissues are unremarkable. Impression: No acute osseous abnormality. Postoperative changes as discussed above. Degenerative changes as described.  Workstation performed: [id number]

## 2023-07-19 NOTE — PATIENT INSTRUCTIONS
Scheduled date of EGD/colonoscopy (as of today): 9/21/23  Physician performing EGD/colonoscopy: Josue  Location of EGD/colonoscopy: [facility]  Desired bowel prep reviewed with patient: Servando/Quang  Instructions reviewed with patient by: Olinda STAFFORD  Clearances:

## 2023-07-20 DIAGNOSIS — I10 PRIMARY HYPERTENSION: ICD-10-CM

## 2023-07-20 RX ORDER — HYDROCHLOROTHIAZIDE 25 MG/1
25 TABLET ORAL DAILY
Qty: 90 TABLET | Refills: 1 | Status: SHIPPED | OUTPATIENT
Start: 2023-07-20 | End: 2023-10-18

## 2023-08-02 ENCOUNTER — TELEPHONE (OUTPATIENT)
Dept: FAMILY MEDICINE CLINIC | Facility: CLINIC | Age: 53
End: 2023-08-02

## 2023-08-02 NOTE — TELEPHONE ENCOUNTER
Sheila Swenson calling from [employer] can't see an active order (there is an order from 4/3/2023 - not signed) for Annual mammogram in [address] [place] [address] scheduled for 08/02/2023 @ 4:30 PM. Can a new order be put into Epic? Fax # [phone]  Office line  # [phone]    Please assist. Thank You.

## 2023-08-09 ENCOUNTER — HOSPITAL ENCOUNTER (OUTPATIENT)
Age: 53
Discharge: HOME | End: 2023-08-09
Payer: COMMERCIAL

## 2023-08-09 DIAGNOSIS — Z12.31 ENCOUNTER FOR SCREENING MAMMOGRAM FOR MALIGNANT NEOPLASM OF BREAST: ICD-10-CM

## 2023-08-09 PROCEDURE — 77067 SCR MAMMO BI INCL CAD: CPT

## 2023-08-09 PROCEDURE — 77063 BREAST TOMOSYNTHESIS BI: CPT

## 2023-08-15 NOTE — RESULT ENCOUNTER NOTE
Negative mammogram, follow up with routine screening in 1 year.      Dario Martinez [facility]  8/15/2023 3:10 PM

## 2023-08-19 DIAGNOSIS — F41.9 ANXIETY AND DEPRESSION: ICD-10-CM

## 2023-08-19 DIAGNOSIS — F32.A ANXIETY AND DEPRESSION: ICD-10-CM

## 2023-08-20 RX ORDER — ESCITALOPRAM OXALATE 20 MG/1
20 TABLET ORAL DAILY
Qty: 90 TABLET | Refills: 0 | Status: SHIPPED | OUTPATIENT
Start: 2023-08-20

## 2023-08-23 DIAGNOSIS — I10 PRIMARY HYPERTENSION: ICD-10-CM

## 2023-08-23 DIAGNOSIS — E78.2 MIXED HYPERLIPIDEMIA: ICD-10-CM

## 2023-08-24 RX ORDER — LOSARTAN POTASSIUM 25 MG/1
TABLET ORAL
Qty: 90 TABLET | Refills: 0 | Status: SHIPPED | OUTPATIENT
Start: 2023-08-24

## 2023-08-24 RX ORDER — SIMVASTATIN 20 MG
TABLET ORAL
Qty: 90 TABLET | Refills: 1 | Status: SHIPPED | OUTPATIENT
Start: 2023-08-24

## 2023-09-21 ENCOUNTER — ANESTHESIA EVENT (OUTPATIENT)
Dept: GASTROENTEROLOGY | Facility: HOSPITAL | Age: 53
End: 2023-09-21

## 2023-09-21 ENCOUNTER — ANESTHESIA (OUTPATIENT)
Dept: GASTROENTEROLOGY | Facility: HOSPITAL | Age: 53
End: 2023-09-21

## 2023-09-21 ENCOUNTER — HOSPITAL ENCOUNTER (OUTPATIENT)
Dept: GASTROENTEROLOGY | Facility: HOSPITAL | Age: 53
Setting detail: OUTPATIENT SURGERY
End: 2023-09-21
Payer: COMMERCIAL

## 2023-09-21 VITALS
DIASTOLIC BLOOD PRESSURE: 78 MMHG | TEMPERATURE: 97.9 F | WEIGHT: 198.19 LBS | RESPIRATION RATE: 17 BRPM | OXYGEN SATURATION: 99 % | HEART RATE: 72 BPM | BODY MASS INDEX: 37.42 KG/M2 | HEIGHT: 61 IN | SYSTOLIC BLOOD PRESSURE: 133 MMHG

## 2023-09-21 DIAGNOSIS — F32.A ANXIETY AND DEPRESSION: ICD-10-CM

## 2023-09-21 DIAGNOSIS — Z12.11 COLON CANCER SCREENING: ICD-10-CM

## 2023-09-21 DIAGNOSIS — F41.9 ANXIETY AND DEPRESSION: ICD-10-CM

## 2023-09-21 DIAGNOSIS — K29.60 EROSIVE GASTRITIS: Primary | ICD-10-CM

## 2023-09-21 DIAGNOSIS — K21.9 GASTROESOPHAGEAL REFLUX DISEASE, UNSPECIFIED WHETHER ESOPHAGITIS PRESENT: ICD-10-CM

## 2023-09-21 DIAGNOSIS — I10 PRIMARY HYPERTENSION: ICD-10-CM

## 2023-09-21 PROCEDURE — 88305 TISSUE EXAM BY PATHOLOGIST: CPT | Performed by: STUDENT IN AN ORGANIZED HEALTH CARE EDUCATION/TRAINING PROGRAM

## 2023-09-21 RX ORDER — SODIUM CHLORIDE, SODIUM LACTATE, POTASSIUM CHLORIDE, CALCIUM CHLORIDE 600; 310; 30; 20 MG/100ML; MG/100ML; MG/100ML; MG/100ML
INJECTION, SOLUTION INTRAVENOUS CONTINUOUS PRN
Status: DISCONTINUED | OUTPATIENT
Start: 2023-09-21 | End: 2023-09-21

## 2023-09-21 RX ORDER — PANTOPRAZOLE SODIUM 40 MG/1
40 TABLET, DELAYED RELEASE ORAL
Qty: 60 TABLET | Refills: 5 | Status: SHIPPED | OUTPATIENT
Start: 2023-09-21

## 2023-09-21 RX ORDER — LIDOCAINE HYDROCHLORIDE 20 MG/ML
INJECTION, SOLUTION EPIDURAL; INFILTRATION; INTRACAUDAL; PERINEURAL AS NEEDED
Status: DISCONTINUED | OUTPATIENT
Start: 2023-09-21 | End: 2023-09-21

## 2023-09-21 RX ORDER — PROPOFOL 10 MG/ML
INJECTION, EMULSION INTRAVENOUS AS NEEDED
Status: DISCONTINUED | OUTPATIENT
Start: 2023-09-21 | End: 2023-09-21

## 2023-09-21 RX ORDER — ESCITALOPRAM OXALATE 20 MG/1
20 TABLET ORAL DAILY
Qty: 90 TABLET | Refills: 0 | Status: SHIPPED | OUTPATIENT
Start: 2023-09-21

## 2023-09-21 RX ORDER — LOSARTAN POTASSIUM 25 MG/1
TABLET ORAL
Qty: 90 TABLET | Refills: 0 | Status: SHIPPED | OUTPATIENT
Start: 2023-09-21

## 2023-09-21 RX ADMIN — PROPOFOL 50 MG: 10 INJECTION, EMULSION INTRAVENOUS at 07:53

## 2023-09-21 RX ADMIN — LIDOCAINE HYDROCHLORIDE 60 MG: 20 INJECTION, SOLUTION EPIDURAL; INFILTRATION; INTRACAUDAL; PERINEURAL at 07:44

## 2023-09-21 RX ADMIN — PROPOFOL 50 MG: 10 INJECTION, EMULSION INTRAVENOUS at 08:01

## 2023-09-21 RX ADMIN — SODIUM CHLORIDE, SODIUM LACTATE, POTASSIUM CHLORIDE, AND CALCIUM CHLORIDE: .6; .31; .03; .02 INJECTION, SOLUTION INTRAVENOUS at 07:08

## 2023-09-21 RX ADMIN — PROPOFOL 120 MG: 10 INJECTION, EMULSION INTRAVENOUS at 07:44

## 2023-09-21 RX ADMIN — PROPOFOL 50 MG: 10 INJECTION, EMULSION INTRAVENOUS at 07:57

## 2023-09-21 RX ADMIN — PROPOFOL 50 MG: 10 INJECTION, EMULSION INTRAVENOUS at 07:49

## 2023-09-21 NOTE — H&P
History and Physical - [facility]  Maribel Jackson 45 y.o. female MRN: [id number]                  HPI: Maribel Jackson is a 45y.o. year old female who presents for EGD and colonoscopy for chronic GERD, initial average risk screening. No prior colonoscopy. No family history. REVIEW OF SYSTEMS: Per the HPI, and otherwise unremarkable. Historical Information   Past Medical History:   Diagnosis Date   • Anxiety    • Depression    • GERD (gastroesophageal reflux disease)    • Hypertension    • Urinary tract infection      Past Surgical History:   Procedure Laterality Date   •  SECTION     • FRACTURE SURGERY     • HYSTERECTOMY       Social History   Social History     Substance and Sexual Activity   Alcohol Use Not Currently     Social History     Substance and Sexual Activity   Drug Use Not on file    Comment: medical     Social History     Tobacco Use   Smoking Status Former   • Packs/day: 0.25   • Years: 30.00   • Total pack years: 7.50   • Types: Cigarettes   • Start date: 16   • Quit date: 2022   • Years since quittin.1   Smokeless Tobacco Never     Family History   Problem Relation Age of Onset   • Heart disease Mother    • Cervical cancer Mother 32   • Diabetes type II Father    • Cancer Father    • No Known Problems Daughter    • No Known Problems Maternal Grandmother    • No Known Problems Paternal Grandmother    • Hypertension Brother    • Hyperlipidemia Brother    • No Known Problems Half-Sister    • No Known Problems Half-Sister    • No Known Problems Maternal Aunt    • No Known Problems Maternal Aunt    • No Known Problems Paternal Aunt    • Cancer Paternal Aunt    • Breast cancer Cousin 28       Meds/Allergies     (Not in a hospital admission)      No Known Allergies    Objective     Blood pressure 127/60, pulse 75, temperature 97.5 °F (36.4 °C), temperature source Temporal, resp.  rate 16, height 5' 1" (1.549 m), weight 89.9 kg (198 lb 3.1 oz), SpO2 99 %.       PHYSICAL EXAM    Gen: NAD  CV: RRR  CHEST: Clear  ABD: soft, NT/ND  EXT: no edema  Neuro: AAO      ASSESSMENT/PLAN:  This is a 45y.o. year old female here for chronic GERD, average risk screening    PLAN:   Procedure: EGD and colonoscopy

## 2023-09-21 NOTE — ANESTHESIA PREPROCEDURE EVALUATION
Procedure:  COLONOSCOPY  EGD    Relevant Problems   CARDIO   (+) Mixed hyperlipidemia   (+) Primary hypertension      GI/HEPATIC   (+) Gastroesophageal reflux disease      NEURO/PSYCH   (+) Anxiety and depression      Nervous and Auditory   (+) Demyelinating lesion on MRI (HCC)      Other   (+) Ambulatory dysfunction   (+) Tobacco use disorder        Physical Exam    Airway    Mallampati score: II  TM Distance: >3 FB  Neck ROM: full     Dental       Cardiovascular  Cardiovascular exam normal    Pulmonary  Pulmonary exam normal     Other Findings        Anesthesia Plan  ASA Score- 2     Anesthesia Type- IV sedation with anesthesia with ASA Monitors. Additional Monitors:   Airway Plan:           Plan Factors-Exercise tolerance (METS): >4 METS. Chart reviewed. EKG reviewed. Imaging results reviewed. Existing labs reviewed. Patient summary reviewed. Induction- intravenous. Postoperative Plan-     Informed Consent- Anesthetic plan and risks discussed with patient. I personally reviewed this patient with the CRNA. Discussed and agreed on the Anesthesia Plan with the CRNA. Kimani Marquez

## 2023-09-21 NOTE — ANESTHESIA POSTPROCEDURE EVALUATION
Post-Op Assessment Note    CV Status:  Stable  Pain Score: 0    Pain management: adequate     Mental Status:  Sleepy   Hydration Status:  Stable and euvolemic   PONV Controlled:  None   Airway Patency:  Patent      Post Op Vitals Reviewed: Yes      Staff: CRNA         No notable events documented.     BP   97/52   Temp   97.9   Pulse  69   Resp   16   SpO2   100

## 2023-09-25 PROCEDURE — 88305 TISSUE EXAM BY PATHOLOGIST: CPT | Performed by: STUDENT IN AN ORGANIZED HEALTH CARE EDUCATION/TRAINING PROGRAM

## 2023-11-12 DIAGNOSIS — K29.60 EROSIVE GASTRITIS: ICD-10-CM

## 2023-11-13 RX ORDER — PANTOPRAZOLE SODIUM 40 MG/1
40 TABLET, DELAYED RELEASE ORAL
Qty: 90 TABLET | Refills: 1 | Status: SHIPPED | OUTPATIENT
Start: 2023-11-13

## 2023-11-21 ENCOUNTER — OFFICE VISIT (OUTPATIENT)
Dept: FAMILY MEDICINE CLINIC | Facility: CLINIC | Age: 53
End: 2023-11-21
Payer: COMMERCIAL

## 2023-11-21 VITALS
BODY MASS INDEX: 37.38 KG/M2 | TEMPERATURE: 100.7 F | WEIGHT: 198 LBS | HEIGHT: 61 IN | OXYGEN SATURATION: 99 % | HEART RATE: 80 BPM

## 2023-11-21 DIAGNOSIS — U07.1 COVID-19: Primary | ICD-10-CM

## 2023-11-21 PROBLEM — R42 DIZZINESS: Status: RESOLVED | Noted: 2023-04-10 | Resolved: 2023-11-21

## 2023-11-21 PROCEDURE — 99213 OFFICE O/P EST LOW 20 MIN: CPT | Performed by: PHYSICIAN ASSISTANT

## 2023-11-21 PROCEDURE — 87636 SARSCOV2 & INF A&B AMP PRB: CPT | Performed by: PHYSICIAN ASSISTANT

## 2023-11-21 RX ORDER — NIRMATRELVIR AND RITONAVIR 300-100 MG
3 KIT ORAL 2 TIMES DAILY
Qty: 30 TABLET | Refills: 0 | Status: SHIPPED | OUTPATIENT
Start: 2023-11-21 | End: 2023-11-26

## 2023-11-21 RX ORDER — ALBUTEROL SULFATE 90 UG/1
2 AEROSOL, METERED RESPIRATORY (INHALATION) EVERY 6 HOURS PRN
Qty: 18 G | Refills: 1 | Status: SHIPPED | OUTPATIENT
Start: 2023-11-21

## 2023-11-21 NOTE — PROGRESS NOTES
Assessment/Plan:          Diagnoses and all orders for this visit:    COVID-19  -     nirmatrelvir & ritonavir (Paxlovid, 300/100,) tablet therapy pack; Take 3 tablets by mouth 2 (two) times a day for 5 days Take 2 nirmatrelvir tablets + 1 ritonavir tablet together per dose  -     albuterol (Ventolin HFA) 90 mcg/act inhaler; Inhale 2 puffs every 6 (six) hours as needed for wheezing  -     Covid/Flu- Office Collect            Subjective:      Patient ID: Maya Adams is a 52 y.o. female. Patient started feeling ill on . Yesterday it worsened. She states today she has been running a fever and has a bad cough. She also is complaining of headaches and muscle aches. She did take a home COVID test which was positive. The following portions of the patient's history were reviewed and updated as appropriate:   She has a past medical history of Anxiety, Depression, GERD (gastroesophageal reflux disease), Hypertension, and Urinary tract infection. ,  does not have any pertinent problems on file. ,   has a past surgical history that includes  section; Hysterectomy; and Fracture surgery. ,  family history includes Breast cancer (age of onset: 28) in her cousin; Cancer in her father and paternal aunt; Cervical cancer (age of onset: 32) in her mother; Diabetes type II in her father; Heart disease in her mother; Hyperlipidemia in her brother; Hypertension in her brother; No Known Problems in her daughter, half-sister, half-sister, maternal aunt, maternal aunt, maternal grandmother, paternal aunt, and paternal grandmother. ,   reports that she quit smoking about 15 months ago. Her smoking use included cigarettes. She started smoking about 41 years ago. She has a 7.50 pack-year smoking history. She has never used smokeless tobacco. She reports that she does not currently use alcohol. Drug: Marijuana. ,  has No Known Allergies. .  Current Outpatient Medications   Medication Sig Dispense Refill   • albuterol (Ventolin HFA) 90 mcg/act inhaler Inhale 2 puffs every 6 (six) hours as needed for wheezing 18 g 1   • cyclobenzaprine (FLEXERIL) 5 mg tablet Take 1 tablet (5 mg total) by mouth 3 (three) times a day as needed for muscle spasms 15 tablet 0   • escitalopram (LEXAPRO) 20 mg tablet TAKE 1 TABLET BY MOUTH EVERY DAY AS DIRECTED 90 tablet 0   • losartan (COZAAR) 25 mg tablet TAKE 1 TABLET BY MOUTH EVERY DAY 90 tablet 0   • methylPREDNISolone 4 MG tablet therapy pack Use as directed on package 21 each 0   • nirmatrelvir & ritonavir (Paxlovid, 300/100,) tablet therapy pack Take 3 tablets by mouth 2 (two) times a day for 5 days Take 2 nirmatrelvir tablets + 1 ritonavir tablet together per dose 30 tablet 0   • pantoprazole (PROTONIX) 40 mg tablet TAKE 1 TABLET BY MOUTH DAILY BEFORE BREAKFAST 90 tablet 1   • simvastatin (ZOCOR) 20 mg tablet TAKE 1 TABLET BY MOUTH EVERY DAY IN THE EVENING 90 tablet 1   • estradiol (ESTRACE) 2 MG tablet TAKE 1 TABLET BY MOUTH EVERY DAY (Patient not taking: Reported on 6/28/2023) 30 tablet 0   • famotidine (PEPCID) 20 mg tablet TAKE 1 TABLET BY MOUTH TWICE A DAY (Patient not taking: Reported on 11/21/2023) 180 tablet 1   • gabapentin (Neurontin) 300 mg capsule Take 1 capsule (300 mg total) by mouth daily at bedtime (Patient not taking: Reported on 6/28/2023) 4 capsule 0   • hydrochlorothiazide (HYDRODIURIL) 25 mg tablet TAKE 1 TABLET (25 MG TOTAL) BY MOUTH DAILY. 90 tablet 1   • meclizine (ANTIVERT) 25 mg tablet Take 1 tablet (25 mg total) by mouth 3 (three) times a day as needed for dizziness (Patient not taking: Reported on 6/28/2023) 30 tablet 0     No current facility-administered medications for this visit. Review of Systems   Constitutional:  Positive for chills, diaphoresis, fatigue and fever. HENT:  Positive for congestion, sinus pressure and sore throat. Respiratory:  Positive for cough, chest tightness and shortness of breath. Cardiovascular:  Negative for palpitations. Neurological:  Positive for headaches. Negative for dizziness. Objective:  Vitals:    11/21/23 1427   Pulse: 80   Temp: (!) 100.7 °F (38.2 °C)   SpO2: 99%   Weight: 89.8 kg (198 lb)   Height: 5' 1" (1.549 m)     Body mass index is 37.41 kg/m². Physical Exam  Constitutional:       Appearance: She is ill-appearing. HENT:      Right Ear: Tympanic membrane, ear canal and external ear normal.      Left Ear: Tympanic membrane, ear canal and external ear normal.      Nose:      Comments: deferred     Mouth/Throat:      Comments: deferred  Eyes:      Conjunctiva/sclera: Conjunctivae normal.   Cardiovascular:      Rate and Rhythm: Normal rate and regular rhythm. Heart sounds: Normal heart sounds. Pulmonary:      Effort: Pulmonary effort is normal.      Breath sounds: Normal breath sounds. Neurological:      Mental Status: She is alert and oriented to person, place, and time.    Psychiatric:         Mood and Affect: Mood normal.         Behavior: Behavior normal.

## 2023-11-21 NOTE — LETTER
November 21, 2023    Patient: Gerhardt Bombard  YOB: 1970  Date of Last Encounter: 8/2/2023      To whom it may concern:     Gerhardt Bombard has tested positive for COVID-19 (Coronavirus). She may return to work on 11/29/2023, which is 10 days from illness onset (provided symptoms are improving) and 24 hours without fever.     Sincerely,         Devendra Graff PA-C

## 2023-11-22 DIAGNOSIS — K21.9 GASTROESOPHAGEAL REFLUX DISEASE, UNSPECIFIED WHETHER ESOPHAGITIS PRESENT: ICD-10-CM

## 2023-11-22 LAB
FLUAV RNA RESP QL NAA+PROBE: NEGATIVE
FLUBV RNA RESP QL NAA+PROBE: NEGATIVE
SARS-COV-2 RNA RESP QL NAA+PROBE: POSITIVE

## 2023-11-22 RX ORDER — FAMOTIDINE 20 MG/1
TABLET, FILM COATED ORAL
Qty: 180 TABLET | Refills: 1 | Status: SHIPPED | OUTPATIENT
Start: 2023-11-22

## 2023-11-24 ENCOUNTER — TELEPHONE (OUTPATIENT)
Dept: FAMILY MEDICINE CLINIC | Facility: CLINIC | Age: 53
End: 2023-11-24

## 2023-11-24 NOTE — TELEPHONE ENCOUNTER
PA submitted through [employer] portal. We are awaiting a determination of coverage from pt's plan. Eleazar  Key: [id number] - PA Case ID: [id number] - Rx #: [id number]  Need help?  Call us at [phone]  Status   Sent to [employer]

## 2023-11-24 NOTE — TELEPHONE ENCOUNTER
[facility] 90 mcg/act inhaler    Requires prior auth. KEY CODE :  [id number]      Proceed with PA?        Pleased advise

## 2023-11-28 ENCOUNTER — TELEPHONE (OUTPATIENT)
Dept: FAMILY MEDICINE CLINIC | Facility: CLINIC | Age: 53
End: 2023-11-28

## 2023-11-28 NOTE — TELEPHONE ENCOUNTER
T/c from pt -- states she has finished the Paxlovid and states she has not had a fever. Pt states she has a cough still. Pt is wondering if she is able to go back to work still.      Please advise

## 2024-01-02 DIAGNOSIS — I10 PRIMARY HYPERTENSION: ICD-10-CM

## 2024-01-02 RX ORDER — LOSARTAN POTASSIUM 25 MG/1
TABLET ORAL
Qty: 90 TABLET | Refills: 1 | Status: SHIPPED | OUTPATIENT
Start: 2024-01-02

## 2024-01-13 DIAGNOSIS — I10 PRIMARY HYPERTENSION: ICD-10-CM

## 2024-01-16 ENCOUNTER — TELEPHONE (OUTPATIENT)
Dept: NEUROLOGY | Facility: CLINIC | Age: 54
End: 2024-01-16

## 2024-01-16 NOTE — TELEPHONE ENCOUNTER
Per Dr. Nguyen's recommendation; called patient and rescheduled her with Dr Jauregui for 1/29/24 @ 2PM in [place]. She will follow up in [place].

## 2024-01-17 RX ORDER — HYDROCHLOROTHIAZIDE 25 MG/1
25 TABLET ORAL DAILY
Qty: 90 TABLET | Refills: 1 | Status: SHIPPED | OUTPATIENT
Start: 2024-01-17 | End: 2024-04-16

## 2024-01-26 ENCOUNTER — TELEPHONE (OUTPATIENT)
Dept: NEUROLOGY | Facility: CLINIC | Age: 54
End: 2024-01-26

## 2024-01-29 ENCOUNTER — OFFICE VISIT (OUTPATIENT)
Dept: NEUROLOGY | Facility: CLINIC | Age: 54
End: 2024-01-29
Payer: COMMERCIAL

## 2024-01-29 VITALS
WEIGHT: 200.9 LBS | SYSTOLIC BLOOD PRESSURE: 138 MMHG | BODY MASS INDEX: 37.93 KG/M2 | HEART RATE: 79 BPM | HEIGHT: 61 IN | DIASTOLIC BLOOD PRESSURE: 74 MMHG | TEMPERATURE: 97.7 F

## 2024-01-29 DIAGNOSIS — R90.82 WHITE MATTER ABNORMALITY ON MRI OF BRAIN: ICD-10-CM

## 2024-01-29 DIAGNOSIS — R90.89 ABNORMAL FINDING ON MRI OF BRAIN: Primary | ICD-10-CM

## 2024-01-29 PROCEDURE — 99205 OFFICE O/P NEW HI 60 MIN: CPT | Performed by: PSYCHIATRY & NEUROLOGY

## 2024-01-29 RX ORDER — AMITRIPTYLINE HYDROCHLORIDE 10 MG/1
TABLET, FILM COATED ORAL
Qty: 90 TABLET | Refills: 3 | Status: SHIPPED | OUTPATIENT
Start: 2024-01-29
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
## 2024-02-02 ENCOUNTER — TELEPHONE (OUTPATIENT)
Dept: NEUROLOGY | Facility: CLINIC | Age: 54
End: 2024-02-02

## 2024-02-02 NOTE — TELEPHONE ENCOUNTER
Shelly  2/1 9:38 AM     Mayte hernandez. I was in to see the doctor monday. I Need to get blood work on the kidneys. Today's the last day.  I go in for an MRI appointments next Thursday. When I went to the lab in [facility] office, they did not receive any orders for lab work. Could you please have the doctor put the lab work in today? So I can have it done after work.  Could you just please leave me a message that you got this and that the lab work will be put in for my kidneys. I am not able to fast today just to have the blood work done. So my kidneys today is the last day. My number is [phone]. Thank you.  ____________  per chart review, bmp was entered 1/31  MRI scheduled 2/8    I returned call, reached ; left detailed message that basic metabolic panel , which includes kidney function, was entered in her chart 1/31. Please cb if anything else needed.  I apologized for any confusion.

## 2024-02-03 ENCOUNTER — APPOINTMENT (OUTPATIENT)
Age: 54
End: 2024-02-03
Payer: COMMERCIAL

## 2024-02-03 DIAGNOSIS — R90.82 WHITE MATTER ABNORMALITY ON MRI OF BRAIN: ICD-10-CM

## 2024-02-03 DIAGNOSIS — E78.2 MIXED HYPERLIPIDEMIA: ICD-10-CM

## 2024-02-03 DIAGNOSIS — R90.89 ABNORMAL FINDING ON MRI OF BRAIN: ICD-10-CM

## 2024-02-03 DIAGNOSIS — K21.9 GASTROESOPHAGEAL REFLUX DISEASE, UNSPECIFIED WHETHER ESOPHAGITIS PRESENT: ICD-10-CM

## 2024-02-03 DIAGNOSIS — R63.5 WEIGHT GAIN: ICD-10-CM

## 2024-02-03 LAB
ANION GAP SERPL CALCULATED.3IONS-SCNC: 6 MMOL/L
BUN SERPL-MCNC: 16 MG/DL (ref 5–25)
CALCIUM SERPL-MCNC: 9.2 MG/DL (ref 8.4–10.2)
CHLORIDE SERPL-SCNC: 106 MMOL/L (ref 96–108)
CO2 SERPL-SCNC: 30 MMOL/L (ref 21–32)
CREAT SERPL-MCNC: 0.63 MG/DL (ref 0.6–1.3)
GFR SERPL CREATININE-BSD FRML MDRD: 101 ML/MIN/1.73SQ M
GLUCOSE P FAST SERPL-MCNC: 87 MG/DL (ref 65–99)
POTASSIUM SERPL-SCNC: 3.7 MMOL/L (ref 3.5–5.3)
SODIUM SERPL-SCNC: 142 MMOL/L (ref 135–147)

## 2024-02-03 PROCEDURE — 80048 BASIC METABOLIC PNL TOTAL CA: CPT

## 2024-02-03 PROCEDURE — 36415 COLL VENOUS BLD VENIPUNCTURE: CPT

## 2024-02-08 ENCOUNTER — HOSPITAL ENCOUNTER (OUTPATIENT)
Dept: MRI IMAGING | Facility: HOSPITAL | Age: 54
End: 2024-02-08
Attending: PSYCHIATRY & NEUROLOGY
Payer: COMMERCIAL

## 2024-02-08 DIAGNOSIS — R90.89 ABNORMAL FINDING ON MRI OF BRAIN: ICD-10-CM

## 2024-02-08 PROCEDURE — G1004 CDSM NDSC: HCPCS

## 2024-02-08 PROCEDURE — 70553 MRI BRAIN STEM W/O & W/DYE: CPT

## 2024-02-08 PROCEDURE — 72156 MRI NECK SPINE W/O & W/DYE: CPT

## 2024-02-08 PROCEDURE — A9585 GADOBUTROL INJECTION: HCPCS | Performed by: PSYCHIATRY & NEUROLOGY

## 2024-02-08 RX ORDER — GADOBUTROL 604.72 MG/ML
9 INJECTION INTRAVENOUS
Status: COMPLETED | OUTPATIENT
Start: 2024-02-08 | End: 2024-02-08

## 2024-02-08 RX ADMIN — GADOBUTROL 9 ML: 604.72 INJECTION INTRAVENOUS at 17:42

## 2024-02-09 ENCOUNTER — HOSPITAL ENCOUNTER (OUTPATIENT)
Dept: MRI IMAGING | Facility: HOSPITAL | Age: 54
End: 2024-02-09
Attending: PSYCHIATRY & NEUROLOGY
Payer: COMMERCIAL

## 2024-02-09 DIAGNOSIS — R90.89 ABNORMAL FINDING ON MRI OF BRAIN: ICD-10-CM

## 2024-02-09 PROCEDURE — A9585 GADOBUTROL INJECTION: HCPCS | Performed by: PSYCHIATRY & NEUROLOGY

## 2024-02-09 PROCEDURE — G1004 CDSM NDSC: HCPCS

## 2024-02-09 PROCEDURE — 72157 MRI CHEST SPINE W/O & W/DYE: CPT

## 2024-02-09 RX ORDER — GADOBUTROL 604.72 MG/ML
9 INJECTION INTRAVENOUS
Status: COMPLETED | OUTPATIENT
Start: 2024-02-09 | End: 2024-02-09

## 2024-02-09 RX ADMIN — GADOBUTROL 9 ML: 604.72 INJECTION INTRAVENOUS at 18:06

## 2024-02-15 ENCOUNTER — TELEPHONE (OUTPATIENT)
Dept: NEUROLOGY | Facility: CLINIC | Age: 54
End: 2024-02-15

## 2024-02-15 ENCOUNTER — TELEPHONE (OUTPATIENT)
Age: 54
End: 2024-02-15

## 2024-02-15 DIAGNOSIS — S22.000A COMPRESSION FRACTURE OF BODY OF THORACIC VERTEBRA (HCC): Primary | ICD-10-CM

## 2024-02-15 DIAGNOSIS — E04.1 THYROID NODULE: ICD-10-CM

## 2024-02-15 NOTE — PROGRESS NOTES
1. Compression fracture of body of thoracic vertebra (HCC)    - Ambulatory referral to Neurosurgery; Future    2. Thyroid nodule    - US thyroid; Future        Michael Jauregui MD.   Staff Neurologist  02/15/24   12:23 PM

## 2024-02-15 NOTE — TELEPHONE ENCOUNTER
Caller: Mayte    Doctor/Office: Neurosurgery    Call regarding :  Scheduling     Call was transferred to: neurosurgery

## 2024-02-15 NOTE — TELEPHONE ENCOUNTER
Patient is returning a call back to Dr. Jauregui. Patient is requesting to be called at 4. Due to her not getting out of work till 330.

## 2024-02-22 ENCOUNTER — HOSPITAL ENCOUNTER (OUTPATIENT)
Dept: ULTRASOUND IMAGING | Facility: CLINIC | Age: 54
Discharge: HOME | End: 2024-02-22
Payer: COMMERCIAL

## 2024-02-22 DIAGNOSIS — E04.1 THYROID NODULE: ICD-10-CM

## 2024-02-22 PROCEDURE — 76536 US EXAM OF HEAD AND NECK: CPT

## 2024-02-23 ENCOUNTER — TELEPHONE (OUTPATIENT)
Dept: NEUROLOGY | Facility: CLINIC | Age: 54
End: 2024-02-23

## 2024-03-01 ENCOUNTER — TELEPHONE (OUTPATIENT)
Dept: NEUROLOGY | Facility: CLINIC | Age: 54
End: 2024-03-01

## 2024-03-01 NOTE — TELEPHONE ENCOUNTER
Called patient left voicemail that appointment on 3/19/24 with  has been changed to Virtual as Provider office has permanently relocated. Left call back number.

## 2024-03-01 NOTE — TELEPHONE ENCOUNTER
Patient called call center back and they transfer the call to. I read the message to the patient leopoldo joyner and confirm with her that the appt is now virtual.

## 2024-03-08 ENCOUNTER — TELEPHONE (OUTPATIENT)
Dept: NEUROLOGY | Facility: CLINIC | Age: 54
End: 2024-03-08

## 2024-03-08 NOTE — TELEPHONE ENCOUNTER
----- Message from Rodriguezelina Jauregui MD sent at 3/5/2024 10:46 AM EST -----  Good morning team, can we call the patient and communicate the test results? The Thyroid is showing a nodule for which they are recommending that she sees an endocrinologist. I have already placed a referral. I tried to call yesterday but she did not picked up the phone.     Thank you,     Dr. Juventino MD.   03/05/24   10:46 AM

## 2024-03-14 ENCOUNTER — TELEPHONE (OUTPATIENT)
Dept: FAMILY MEDICINE CLINIC | Facility: CLINIC | Age: 54
End: 2024-03-14

## 2024-03-14 NOTE — TELEPHONE ENCOUNTER
PA submitted back in November and denied.     Mayte Lima (Jones: [id number]) - [id number]  Albuterol Sulfate  (90 Base)MCG/ACT aerosol  Status: PA Response - DeniedCreated: November 21st, 2023 [phone]: November 24th, 2023

## 2024-03-19 ENCOUNTER — TELEMEDICINE (OUTPATIENT)
Age: 54
End: 2024-03-19
Payer: COMMERCIAL

## 2024-03-19 VITALS
WEIGHT: 200 LBS | SYSTOLIC BLOOD PRESSURE: 169 MMHG | HEART RATE: 92 BPM | BODY MASS INDEX: 37.76 KG/M2 | HEIGHT: 61 IN | DIASTOLIC BLOOD PRESSURE: 94 MMHG

## 2024-03-19 DIAGNOSIS — S22.000A COMPRESSION FRACTURE OF BODY OF THORACIC VERTEBRA (HCC): ICD-10-CM

## 2024-03-19 DIAGNOSIS — R26.2 AMBULATORY DYSFUNCTION: Primary | ICD-10-CM

## 2024-03-19 DIAGNOSIS — E78.2 MIXED HYPERLIPIDEMIA: ICD-10-CM

## 2024-03-19 DIAGNOSIS — F41.1 GAD (GENERALIZED ANXIETY DISORDER): ICD-10-CM

## 2024-03-19 DIAGNOSIS — K29.60 EROSIVE GASTRITIS: ICD-10-CM

## 2024-03-19 PROCEDURE — 99215 OFFICE O/P EST HI 40 MIN: CPT | Performed by: PSYCHIATRY & NEUROLOGY

## 2024-03-19 RX ORDER — GABAPENTIN 300 MG/1
CAPSULE ORAL
Qty: 120 CAPSULE | Refills: 3 | Status: SHIPPED | OUTPATIENT
Start: 2024-03-19
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
## 2024-03-20 RX ORDER — PANTOPRAZOLE SODIUM 40 MG/1
40 TABLET, DELAYED RELEASE ORAL
Qty: 90 TABLET | Refills: 1 | Status: SHIPPED | OUTPATIENT
Start: 2024-03-20

## 2024-03-20 RX ORDER — SIMVASTATIN 20 MG
TABLET ORAL
Qty: 90 TABLET | Refills: 1 | Status: SHIPPED | OUTPATIENT
Start: 2024-03-20

## 2024-03-21 PROBLEM — S22.000A COMPRESSION FRACTURE OF THORACIC VERTEBRA (HCC): Status: ACTIVE | Noted: 2024-03-21

## 2024-03-22 ENCOUNTER — APPOINTMENT (OUTPATIENT)
Age: 54
End: 2024-03-22
Payer: COMMERCIAL

## 2024-03-22 DIAGNOSIS — S22.000A COMPRESSION FRACTURE OF BODY OF THORACIC VERTEBRA (HCC): ICD-10-CM

## 2024-03-22 LAB
25(OH)D3 SERPL-MCNC: 18.5 NG/ML (ref 30–100)
ALBUMIN SERPL BCP-MCNC: 4 G/DL (ref 3.5–5)
ALP SERPL-CCNC: 68 U/L (ref 34–104)
ALT SERPL W P-5'-P-CCNC: 28 U/L (ref 7–52)
ANION GAP SERPL CALCULATED.3IONS-SCNC: 8 MMOL/L (ref 4–13)
AST SERPL W P-5'-P-CCNC: 24 U/L (ref 13–39)
BASOPHILS # BLD AUTO: 0.05 THOUSANDS/ÂΜL (ref 0–0.1)
BASOPHILS NFR BLD AUTO: 1 % (ref 0–1)
BILIRUB SERPL-MCNC: 0.44 MG/DL (ref 0.2–1)
BUN SERPL-MCNC: 14 MG/DL (ref 5–25)
CALCIUM SERPL-MCNC: 9.7 MG/DL (ref 8.4–10.2)
CHLORIDE SERPL-SCNC: 103 MMOL/L (ref 96–108)
CHOLEST SERPL-MCNC: 174 MG/DL
CO2 SERPL-SCNC: 33 MMOL/L (ref 21–32)
CREAT SERPL-MCNC: 0.6 MG/DL (ref 0.6–1.3)
EOSINOPHIL # BLD AUTO: 0.6 THOUSAND/ÂΜL (ref 0–0.61)
EOSINOPHIL NFR BLD AUTO: 8 % (ref 0–6)
ERYTHROCYTE [DISTWIDTH] IN BLOOD BY AUTOMATED COUNT: 12.5 % (ref 11.6–15.1)
GFR SERPL CREATININE-BSD FRML MDRD: 102 ML/MIN/1.73SQ M
GLUCOSE P FAST SERPL-MCNC: 80 MG/DL (ref 65–99)
HCT VFR BLD AUTO: 42.5 % (ref 34.8–46.1)
HDLC SERPL-MCNC: 51 MG/DL
HGB BLD-MCNC: 13.6 G/DL (ref 11.5–15.4)
IMM GRANULOCYTES # BLD AUTO: 0.01 THOUSAND/UL (ref 0–0.2)
IMM GRANULOCYTES NFR BLD AUTO: 0 % (ref 0–2)
LDLC SERPL CALC-MCNC: 80 MG/DL (ref 0–100)
LYMPHOCYTES # BLD AUTO: 1.83 THOUSANDS/ÂΜL (ref 0.6–4.47)
LYMPHOCYTES NFR BLD AUTO: 25 % (ref 14–44)
MCH RBC QN AUTO: 29.5 PG (ref 26.8–34.3)
MCHC RBC AUTO-ENTMCNC: 32 G/DL (ref 31.4–37.4)
MCV RBC AUTO: 92 FL (ref 82–98)
MONOCYTES # BLD AUTO: 0.49 THOUSAND/ÂΜL (ref 0.17–1.22)
MONOCYTES NFR BLD AUTO: 7 % (ref 4–12)
NEUTROPHILS # BLD AUTO: 4.43 THOUSANDS/ÂΜL (ref 1.85–7.62)
NEUTS SEG NFR BLD AUTO: 59 % (ref 43–75)
NONHDLC SERPL-MCNC: 123 MG/DL
NRBC BLD AUTO-RTO: 0 /100 WBCS
PLATELET # BLD AUTO: 267 THOUSANDS/UL (ref 149–390)
PMV BLD AUTO: 10.5 FL (ref 8.9–12.7)
POTASSIUM SERPL-SCNC: 3.7 MMOL/L (ref 3.5–5.3)
PROT SERPL-MCNC: 6.4 G/DL (ref 6.4–8.4)
RBC # BLD AUTO: 4.61 MILLION/UL (ref 3.81–5.12)
SODIUM SERPL-SCNC: 144 MMOL/L (ref 135–147)
T4 FREE SERPL-MCNC: 0.86 NG/DL (ref 0.61–1.12)
TRIGL SERPL-MCNC: 213 MG/DL
TSH SERPL DL<=0.05 MIU/L-ACNC: 0.07 UIU/ML (ref 0.45–4.5)
WBC # BLD AUTO: 7.41 THOUSAND/UL (ref 4.31–10.16)

## 2024-03-22 PROCEDURE — 36415 COLL VENOUS BLD VENIPUNCTURE: CPT

## 2024-03-22 PROCEDURE — 85025 COMPLETE CBC W/AUTO DIFF WBC: CPT

## 2024-03-22 PROCEDURE — 80061 LIPID PANEL: CPT

## 2024-03-22 PROCEDURE — 82306 VITAMIN D 25 HYDROXY: CPT

## 2024-03-22 PROCEDURE — 84443 ASSAY THYROID STIM HORMONE: CPT

## 2024-03-22 PROCEDURE — 80053 COMPREHEN METABOLIC PANEL: CPT

## 2024-03-22 PROCEDURE — 84439 ASSAY OF FREE THYROXINE: CPT

## 2024-04-16 ENCOUNTER — CONSULT (OUTPATIENT)
Dept: NEUROSURGERY | Facility: CLINIC | Age: 54
End: 2024-04-16
Payer: COMMERCIAL

## 2024-04-16 VITALS
RESPIRATION RATE: 16 BRPM | DIASTOLIC BLOOD PRESSURE: 76 MMHG | BODY MASS INDEX: 37.12 KG/M2 | SYSTOLIC BLOOD PRESSURE: 112 MMHG | OXYGEN SATURATION: 97 % | HEART RATE: 93 BPM | HEIGHT: 61 IN | TEMPERATURE: 97.5 F | WEIGHT: 196.6 LBS

## 2024-04-16 DIAGNOSIS — S22.000A COMPRESSION FRACTURE OF THORACIC VERTEBRA, UNSPECIFIED THORACIC VERTEBRAL LEVEL, INITIAL ENCOUNTER (HCC): Primary | ICD-10-CM

## 2024-04-16 DIAGNOSIS — Z98.890 HISTORY OF RIGHT KNEE SURGERY: ICD-10-CM

## 2024-04-16 PROCEDURE — 99204 OFFICE O/P NEW MOD 45 MIN: CPT | Performed by: PHYSICIAN ASSISTANT

## 2024-04-16 RX ORDER — IBUPROFEN 200 MG
400 TABLET ORAL EVERY 6 HOURS PRN
COMMUNITY

## 2024-04-16 RX ORDER — ACETAMINOPHEN 500 MG
1000 TABLET ORAL EVERY 6 HOURS PRN
COMMUNITY
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
## 2024-04-16 NOTE — ASSESSMENT & PLAN NOTE
Patient presents for consultation for chronic T7 compression fracture noted incidentally on MRI T spine during workup for Multiple Sclerosis.     Imaging reviewed personally. Final results below discussed with the patient.  MRI thoracic spine with without contrast 2/9/2024: Chronic T7 superior endplate compression fracture deformity with mild height loss.  Mild degenerative changes of thoracic spine.  No significant canal stenosis or foraminal narrowing.  No evidence of demyelinating disease or abnormal enhancement in thoracic spine.    Plan    Recommend that pt continue to follow up regularly with their PCP with consideration for monitoring of bone health with DEXA scans and labs for vitamin D/Calcium with appropriate supplementation or treatment as needed. Pt reports her recent labs showed vitamin D deficiency thus she is taking Vitamin D tablets to supplement.   Reviewed with patient the above imaging findings that shows chronic T7 superior endplate compression fracture.  Discussed with patient, at this time, no neurosurgical intervention is anticipated.  No brace required.  Patient to continue conservative management with pain mgt as needed.   Further follow up will be on a PRN basis.   Patient made aware to contact neurosurgery with any questions or concerns.

## 2024-05-10 ENCOUNTER — CONSULT (OUTPATIENT)
Dept: ENDOCRINOLOGY | Facility: CLINIC | Age: 54
End: 2024-05-10
Payer: COMMERCIAL

## 2024-05-10 ENCOUNTER — APPOINTMENT (OUTPATIENT)
Dept: LAB | Facility: CLINIC | Age: 54
End: 2024-05-10
Payer: COMMERCIAL

## 2024-05-10 VITALS
DIASTOLIC BLOOD PRESSURE: 72 MMHG | TEMPERATURE: 98.1 F | BODY MASS INDEX: 37.94 KG/M2 | OXYGEN SATURATION: 98 % | HEART RATE: 78 BPM | WEIGHT: 200.8 LBS | SYSTOLIC BLOOD PRESSURE: 130 MMHG

## 2024-05-10 DIAGNOSIS — E04.2 MULTIPLE THYROID NODULES: ICD-10-CM

## 2024-05-10 DIAGNOSIS — E05.90 SUBCLINICAL HYPERTHYROIDISM: Primary | ICD-10-CM

## 2024-05-10 DIAGNOSIS — E05.90 SUBCLINICAL HYPERTHYROIDISM: ICD-10-CM

## 2024-05-10 LAB
T4 FREE SERPL-MCNC: 0.68 NG/DL (ref 0.61–1.12)
TSH SERPL DL<=0.05 MIU/L-ACNC: 0.2 UIU/ML (ref 0.45–4.5)

## 2024-05-10 PROCEDURE — 36415 COLL VENOUS BLD VENIPUNCTURE: CPT

## 2024-05-10 PROCEDURE — 84439 ASSAY OF FREE THYROXINE: CPT

## 2024-05-10 PROCEDURE — 99204 OFFICE O/P NEW MOD 45 MIN: CPT | Performed by: STUDENT IN AN ORGANIZED HEALTH CARE EDUCATION/TRAINING PROGRAM

## 2024-05-10 PROCEDURE — 83520 IMMUNOASSAY QUANT NOS NONAB: CPT

## 2024-05-10 PROCEDURE — 84443 ASSAY THYROID STIM HORMONE: CPT

## 2024-05-10 NOTE — ASSESSMENT & PLAN NOTE
She is clinically euthyroid, with suppressed TSH and normal free T4, in favor of subclinical hyperthyroidism, we reviewed differential diagnosis, given thyroid nodule present, I did ordered thyroid uptake and scan to evaluate for thyroid toxic adenoma, I ordered TRAb antibody as well.  Further management pending above results.

## 2024-05-10 NOTE — ASSESSMENT & PLAN NOTE
Incidentally was found in MRI of cervical spine, subsequently thyroid ultrasound showed left mid gland nodule measuring 0.7 x 0.4 x 0.6 cm, with TR 5 (punctate echogenic foci) and left lower pole nodule measuring 3.1 x 2 x 1.9 cm, TR 3.  No compressive symptoms, no risk factors for thyroid cancer including previous radiation to head or neck or family history of thyroid cancer.  We reviewed pathophysiology of thyroid nodules,  As TSH is suppressed, I do recommend performing thyroid iodine uptake and scan, which was ordered, if nodule is hot nodule, risk of thyroid cancer is very low, if called nodule, will proceed with biopsy.

## 2024-05-10 NOTE — PROGRESS NOTES
Mayte Lima is a 53 y.o. female presents for evaluation for thyroid nodule(s).  Thyroid nodule initially was detected on MRI of cervical spine with and without contrast which showed 1.6 cm heterogeneously enhancing nodule in the left thyroid lobe which prompted to do thyroid ultrasound and revealed left mid gland nodule measuring 0.7 x 0.4 x 0.6 cm with TR 5, and left lower pole nodule measuring 3.1 x 2 x 1.9 cm TR 3 nodule       Associated signs/symptoms:  Trouble swallowing  No  Hoarseness/change in voice:  {No  Trouble breathing  No     Modifying factors which can cause nodule formation:  H/o Radiation to the head or neck region No  History of thyroid cancer in one or more first degree relativesNo  Prior hemithyroidectomy with discovery of thyroid cancer No  Family history of thyroid disease: Yes     Fatigue: Yes  No weight loss/ No weight gain  NoChange in appetite  Yesheat intolerance/ Nocold intolerance  Nodiarrhea/ Yesconstipation  Yessweating/Notremor/Yespalpitation  Yesdifficulty sleeping    All other systems were reviewed and are negative.    Patient Active Problem List   Diagnosis    Primary hypertension    Anxiety and depression    Gastroesophageal reflux disease    Mixed hyperlipidemia    Tobacco use disorder    Cervical nerve root impingement    H/O cervical spine surgery    Demyelinating lesion on MRI (HCC)    Ambulatory dysfunction    Hypokalemia    Morbid obesity with BMI of 40.0-44.9, adult ([facility])    COVID-19    Compression fracture of thoracic vertebra ([facility])      Past Medical History:   Diagnosis Date    Anxiety     Depression     GERD (gastroesophageal reflux disease)     Hypertension     IBS (irritable colon syndrome)     IBS-C    Migraine 2020    Stressed    Nodule of neck     Urinary tract infection       Past Surgical History:   Procedure Laterality Date     SECTION      FRACTURE SURGERY      HYSTERECTOMY      NECK SURGERY      H/o C4-5, C5-6, C6-7 ANTERIOR CERVICAL  DISCECTOMY/DECOMPRESSION FUSION, PLATE SCREWS, INTERBODY HARVEST ILIAC CRESR BONE GRAFT AND ASSOSCIATED SURGICAL PROCEDURES/LEVELS w [facility] in 2022      Family History   Problem Relation Age of Onset    Heart disease Mother     Cervical cancer Mother 31    Dementia Mother     Diabetes type II Father     Cancer Father     No Known Problems Daughter     No Known Problems Maternal Grandmother     No Known Problems Paternal Grandmother     Hypertension Brother     Hyperlipidemia Brother     No Known Problems Half-Sister     No Known Problems Half-Sister     No Known Problems Maternal Aunt     No Known Problems Maternal Aunt     No Known Problems Paternal Aunt     Cancer Paternal Aunt     Breast cancer Cousin 35     Social History     Tobacco Use    Smoking status: Every Day     Current packs/day: 0.25     Average packs/day: 0.3 packs/day for 57.2 years (18.0 ttl pk-yrs)     Types: Cigarettes     Start date: 1/1/1982    Smokeless tobacco: Never   Substance Use Topics    Alcohol use: Yes     Comment: occasional     No Known Allergies     Physical Examination:  Vitals:    05/10/24 1036   BP: 130/72   Pulse: 78   Temp: 98.1 °F (36.7 °C)   SpO2: 98%       General appearance - alert, well appearing, and in no distress  Mental status - normal mood, behavior, speech, dress, motor activity, and thought processes  HEENT: Normocephalic/ atraumatic. EOMI  Respiratory : CTAB; no wheeze; no crept  Cardiovascular: regular rate and rhythm,  Abdomen: soft, non distended, non tender  Extremities: No edema. DP 2 + No cyanosis/ no clubbing  Thyroid - normal size,  Nodule not fixed to any adjacent structures, nontender, not firm, location left lobe      Component      Latest Ref Rng 3/22/2024   TSH 3RD GENERATON      0.450 - 4.500 uIU/mL 0.074 (L)    VITAMIN D, 25-HYDROXY      30.0 - 100.0 ng/mL 18.5 (L)    FREE T4      0.61 - 1.12 ng/dL 0.86       Legend:  (L) Low     Imaging:    MRI CERVICAL SPINE WITH AND WITHOUT CONTRAST      INDICATION: R90.89: Other abnormal findings on diagnostic imaging of central nervous system.     COMPARISON: MRI of thoracic spine with and without contrast 2/9/2024. Same day MRI brain with and without contrast.     TECHNIQUE:  Multiplanar, multisequence imaging of the cervical spine was performed before and after gadolinium administration. .        IV Contrast:  9 mL of Gadobutrol injection (SINGLE-DOSE)     IMAGE QUALITY:  Diagnostic.     FINDINGS:     ALIGNMENT: Straightened cervical spine.  No compression fracture.  No subluxation.  No scoliosis.     MARROW SIGNAL: ACDF C4-C7 with associated susceptibility hardware artifact. Otherwise, normal bone marrow signal.     CERVICAL AND VISUALIZED UPPER THORACIC CORD:  Normal signal within the visualized cord.     PREVERTEBRAL AND PARASPINAL SOFT TISSUES: 1.6 cm heterogeneously enhancing nodule in left thyroid lobe (9:36). Incidental discovery of one or more thyroid nodule(s) measuring more than 1.5 cm and without suspicious features is noted in this patient who   is above 35 years old; according to guidelines published in the February 2015 white paper on incidental thyroid nodules in the Journal of the American College of Radiology (JACR), further characterization with thyroid ultrasound is recommended.     VISUALIZED POSTERIOR FOSSA:  The visualized posterior fossa demonstrates no abnormal signal. Please see same day MRI brain with and without contrast for further evaluation.     CERVICAL DISC SPACES:     C1-C2: Normal.     C2-C3: Normal.     C3-C4: Normal.     C4-C5: ACDF. Tiny Central posterior osteophytic ridge. No significant canal stenosis or foraminal narrowing.     C5-C6: ACDF. Small central posterior osteophytic ridge. Mild canal stenosis. No significant foraminal narrowing.     C6-C7: ACDF. Small right paracentral posterior osteophytic ridge. Mild canal stenosis. No significant foraminal narrowing.     C7-T1: Left facet arthropathy. No significant canal  stenosis or foraminal narrowing.     UPPER THORACIC DISC SPACES:  Normal. Please see MRI thoracic spine with and without contrast dated 2/9/2024.     POSTCONTRAST IMAGING:  Normal.     OTHER FINDINGS: Lingual tonsillar hyperplasia.     IMPRESSION:     No demyelinating disease or abnormal enhancement in cervical spine.     Multilevel degenerative changes of postsurgical cervical spine status post ACDF C4-C7 with mild canal stenosis at C5-C6 and C6-C7, as detailed above. No significant foraminal narrowing.     Incidental thyroid nodule(s) for which nonemergent thyroid ultrasound is recommended.     The study was marked in EPIC for significant notification.      THYROID ULTRASOUND     INDICATION: E04.1: Nontoxic single thyroid nodule.     COMPARISON: None     TECHNIQUE: Ultrasound of the thyroid was performed with a high frequency linear transducer in transverse and sagittal planes including volumetric imaging sweeps as well as traditional still imaging technique.     FINDINGS: Normal homogeneous smooth echotexture.     Right lobe: 5.2 x 1.5 x 1.9 cm. Volume 7.1 mL  Left lobe: 5.9 x 1.8 x 2.1 cm. Volume 10.9 mL  Isthmus: 0.3 cm.     Nodule #1.  Image 34.  LEFT midgland nodule measuring 0.7 x 0.4 x 0.6 cm. No priors for comparison.  COMPOSITION: 2 points, solid or almost completely solid.  ECHOGENICITY: 2 points, hypoechoic.  SHAPE: 0 points, wider-than-tall.  MARGIN: 0 points, smooth.  ECHOGENIC FOCI: 3 points, punctate echogenic foci. These are better visualized on the cine images.  TI-RADS Classification: TR 5 (7 or > points), Highly suspicious.  FNA if > 1 cm.  Follow if > 0.5 cm.     Nodule #2.  Image 28.  LEFT lower pole nodule measuring 3.1 x 2 x 1.9 cm. No priors for comparison.  COMPOSITION: 2 points, solid or almost completely solid.  ECHOGENICITY: 1 point, hyperechoic or isoechoic.  SHAPE: 0 points, wider-than-tall.  MARGIN: 0 points, ill-defined.  ECHOGENIC FOCI: 0 points, none or large comet-tail  "artifacts.  TI-RADS Classification: TR 3 (3 points), FNA if >2.5 cm.  Follow if >1.5 cm.     There are additional nodules of lesser size and/or TI-RADS score.  These do not necessitate additional evaluation based on ACR criteria.     IMPRESSION:     Multinodular thyroid gland. The following meet current ACR criteria for recommending ultrasound guided biopsy:     Left lower pole TR 3 nodule.     12-month ultrasound follow-up is also advised for the left mid gland TR 5 nodule.    Previous records including pertinent laboratory and radiographic results were reviewed. Thyroid ultrasound images were independently reviewed.     ASSESSMENT/PLAN:              I have spent a total time of 40 minutes on 05/10/24 in caring for this patient including Diagnostic results, Prognosis, Risks and benefits of tx options, Instructions for management, Patient and family education, Importance of tx compliance, Risk factor reductions, Impressions, Counseling / Coordination of care, Documenting in the medical record, Reviewing / ordering tests, medicine, procedures  , and Obtaining or reviewing history  .      Portions of the record may have been created with voice recognition software.  Occasional wrong word or \"sound a like\" substitutions may have occurred due to the inherent limitations of voice recognition software.  Read the chart carefully and recognize, using context, where substitutions have occurred.       " "generation (Completed)    T4, free (Completed)       I have spent a total time of 40 minutes on 05/10/24 in caring for this patient including Diagnostic results, Prognosis, Risks and benefits of tx options, Instructions for management, Patient and family education, Importance of tx compliance, Risk factor reductions, Impressions, Counseling / Coordination of care, Documenting in the medical record, Reviewing / ordering tests, medicine, procedures  , and Obtaining or reviewing history  .      Portions of the record may have been created with voice recognition software.  Occasional wrong word or \"sound a like\" substitutions may have occurred due to the inherent limitations of voice recognition software.  Read the chart carefully and recognize, using context, where substitutions have occurred.       "

## 2024-05-10 NOTE — PATIENT INSTRUCTIONS
We discussed today that thyroid nodules are very common and 95% of them are benign, you have to nodules located on the left side, one of them is recommended to be biopsied however as your TSH is low which means that you have overactive thyroid we need to do it thyroid uptake and scan to evaluate for toxic/hot nodule, if nodule is hot nodule possibility of cancer is very very low so we do not need to do biopsy

## 2024-05-15 LAB — MISCELLANEOUS LAB TEST RESULT: NORMAL

## 2024-05-16 ENCOUNTER — TELEPHONE (OUTPATIENT)
Age: 54
End: 2024-05-16

## 2024-05-16 NOTE — TELEPHONE ENCOUNTER
"Patient calling back about labs results, per Dr Caruso's note on the results note:    \"TRAb antibody, is positive which is Grave's disease, we will need to start medication called methimazole, awaiting for thyroid scan result, and then we will discuss more\"    Patient states that her scan is scheduled for Wed/Thurs next week. Patient said she was told not to eat or drink 4 hours prior to the scan, but they did not tell her if ok to take AM meds or not.  Please advise.   "

## 2024-05-20 DIAGNOSIS — G89.29 CHRONIC PAIN OF RIGHT KNEE: ICD-10-CM

## 2024-05-20 DIAGNOSIS — M25.561 CHRONIC PAIN OF RIGHT KNEE: ICD-10-CM

## 2024-05-20 DIAGNOSIS — G89.29 CHRONIC PAIN OF LEFT KNEE: Primary | ICD-10-CM

## 2024-05-20 DIAGNOSIS — M25.562 CHRONIC PAIN OF LEFT KNEE: Primary | ICD-10-CM

## 2024-05-20 NOTE — TELEPHONE ENCOUNTER
Message left on machine to hold medications until after the imaging  is done and with any questions to call office back.

## 2024-05-22 ENCOUNTER — HOSPITAL ENCOUNTER (OUTPATIENT)
Dept: NUCLEAR MEDICINE | Facility: HOSPITAL | Age: 54
Discharge: HOME | End: 2024-05-22
Attending: STUDENT IN AN ORGANIZED HEALTH CARE EDUCATION/TRAINING PROGRAM
Payer: COMMERCIAL

## 2024-05-22 DIAGNOSIS — E04.2 MULTIPLE THYROID NODULES: ICD-10-CM

## 2024-05-22 DIAGNOSIS — E05.90 SUBCLINICAL HYPERTHYROIDISM: ICD-10-CM

## 2024-05-22 PROCEDURE — 78014 THYROID IMAGING W/BLOOD FLOW: CPT

## 2024-05-22 PROCEDURE — A9516 IODINE I-123 SOD IODIDE MIC: HCPCS

## 2024-05-23 ENCOUNTER — TELEPHONE (OUTPATIENT)
Age: 54
End: 2024-05-23

## 2024-05-23 ENCOUNTER — HOSPITAL ENCOUNTER (OUTPATIENT)
Dept: NUCLEAR MEDICINE | Facility: HOSPITAL | Age: 54
Discharge: HOME | End: 2024-05-23
Attending: STUDENT IN AN ORGANIZED HEALTH CARE EDUCATION/TRAINING PROGRAM
Payer: COMMERCIAL

## 2024-05-23 ENCOUNTER — APPOINTMENT (OUTPATIENT)
Dept: RADIOLOGY | Facility: CLINIC | Age: 54
End: 2024-05-23
Payer: COMMERCIAL

## 2024-05-23 ENCOUNTER — OFFICE VISIT (OUTPATIENT)
Dept: OBGYN CLINIC | Facility: CLINIC | Age: 54
End: 2024-05-23
Payer: COMMERCIAL

## 2024-05-23 VITALS
BODY MASS INDEX: 37.99 KG/M2 | HEIGHT: 61 IN | DIASTOLIC BLOOD PRESSURE: 85 MMHG | HEART RATE: 75 BPM | WEIGHT: 201.2 LBS | SYSTOLIC BLOOD PRESSURE: 121 MMHG

## 2024-05-23 DIAGNOSIS — M17.0 OSTEOARTHRITIS OF PATELLOFEMORAL JOINTS OF BOTH KNEES: ICD-10-CM

## 2024-05-23 DIAGNOSIS — Z98.890 HISTORY OF OPEN REDUCTION AND INTERNAL FIXATION (ORIF) PROCEDURE: ICD-10-CM

## 2024-05-23 DIAGNOSIS — M16.11 PRIMARY OSTEOARTHRITIS OF ONE HIP, RIGHT: Primary | ICD-10-CM

## 2024-05-23 DIAGNOSIS — Z87.81 HISTORY OF PELVIC FRACTURE: ICD-10-CM

## 2024-05-23 DIAGNOSIS — M25.551 RIGHT HIP PAIN: ICD-10-CM

## 2024-05-23 DIAGNOSIS — Z87.81 HISTORY OF TIBIAL FRACTURE: ICD-10-CM

## 2024-05-23 DIAGNOSIS — M25.562 CHRONIC PAIN OF LEFT KNEE: ICD-10-CM

## 2024-05-23 DIAGNOSIS — G89.29 CHRONIC PAIN OF LEFT KNEE: ICD-10-CM

## 2024-05-23 DIAGNOSIS — E04.2 MULTIPLE THYROID NODULES: Primary | ICD-10-CM

## 2024-05-23 DIAGNOSIS — M54.16 RADICULOPATHY, LUMBAR REGION: ICD-10-CM

## 2024-05-23 DIAGNOSIS — M25.561 CHRONIC PAIN OF RIGHT KNEE: ICD-10-CM

## 2024-05-23 DIAGNOSIS — G89.29 CHRONIC PAIN OF RIGHT KNEE: ICD-10-CM

## 2024-05-23 PROCEDURE — 73562 X-RAY EXAM OF KNEE 3: CPT

## 2024-05-23 PROCEDURE — 99204 OFFICE O/P NEW MOD 45 MIN: CPT | Performed by: ORTHOPAEDIC SURGERY

## 2024-05-23 PROCEDURE — 73502 X-RAY EXAM HIP UNI 2-3 VIEWS: CPT

## 2024-05-23 NOTE — TELEPHONE ENCOUNTER
MD Marah Travis  Caller: Unspecified (Today,  3:47 PM)  I tried to call Mayte, she was not available, I left a   message, so she has positive antibody for Graves' disease  , however her TSH has improved and her free T4   is normal, this called subclinical hyperthyroidism,   however given lack of symptoms and improvement   in TSH I believe this is reasonable to wait for   next few months and repeat thyroid test to   evaluate needs for medication, her thyroid   uptake and scan showed possible thyroiditis   which this can explain change in her thyroid   test results as well, however given she has   1 big nodule on her left thyroid we need to   do biopsy which I did order and she will   need to call central scheduling number to schedule

## 2024-05-23 NOTE — LETTER
May 23, 2024     Patient: Mayte Lima  YOB: 1970  Date of Visit: 5/23/2024      To Whom it May Concern:    Mayte Lima is under my professional care. Mayte was seen in my office on 5/23/2024.     If you have any questions or concerns, please don't hesitate to call.         Sincerely,          Josh Macedo MD        CC: No Recipients

## 2024-05-23 NOTE — TELEPHONE ENCOUNTER
Patient returning phone call. Relayed above message. She is concerned with the biopsy. She is questioning if her thyroid absorbed the chemo. She would like a call back from the office tomorrow to let her know if the biopsy is the best route to go? Please advise.    Provider at bedside.     Kareen Bailey  05/10/24 0041

## 2024-05-23 NOTE — TELEPHONE ENCOUNTER
----- Message from Abhi Caruso MD sent at 5/23/2024  4:13 PM EDT -----  I reviewed thyroid scan, which could not show hot nodule causing low TSH, however raises possibility of thyroiditis, which is inflammation of thyroid which most of the time will go away by itself,  We need to refer her to do biopsy of her left nodule, which I placed the order and she will need to call to schedule,

## 2024-05-23 NOTE — TELEPHONE ENCOUNTER
Left patient message to return call for results.   Also she can check her MyChart for Dr. Caruso's message.    Gave her Central Scheduling phone number to call to schedule her Biopsy of left nodule
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
## 2024-05-23 NOTE — TELEPHONE ENCOUNTER
"Spoke with Mayte.    She was wondering - \"Did the thyroid nodule not absorb any of the radiation during the test?'   Does that mean that you suspect cancer?  If that is the case, she would rather have the nodule removed rather then mess around with it.    Please advise.    She is normally available after 4:30 when she is home from work.    Or I can relay.  "

## 2024-06-06 ENCOUNTER — EVALUATION (OUTPATIENT)
Dept: PHYSICAL THERAPY | Facility: CLINIC | Age: 54
End: 2024-06-06
Payer: COMMERCIAL

## 2024-06-06 DIAGNOSIS — M16.11 PRIMARY OSTEOARTHRITIS OF ONE HIP, RIGHT: ICD-10-CM

## 2024-06-06 DIAGNOSIS — M17.0 OSTEOARTHRITIS OF PATELLOFEMORAL JOINTS OF BOTH KNEES: ICD-10-CM

## 2024-06-06 DIAGNOSIS — M54.16 RADICULOPATHY, LUMBAR REGION: Primary | ICD-10-CM

## 2024-06-06 PROCEDURE — 97163 PT EVAL HIGH COMPLEX 45 MIN: CPT
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
## 2024-06-11 ENCOUNTER — APPOINTMENT (OUTPATIENT)
Dept: PHYSICAL THERAPY | Facility: CLINIC | Age: 54
End: 2024-06-11
Payer: COMMERCIAL

## 2024-06-12 ENCOUNTER — TELEPHONE (OUTPATIENT)
Age: 54
End: 2024-06-12

## 2024-06-12 DIAGNOSIS — I10 PRIMARY HYPERTENSION: ICD-10-CM

## 2024-06-12 DIAGNOSIS — F41.0 PANIC ATTACKS: Primary | ICD-10-CM

## 2024-06-12 DIAGNOSIS — F41.1 GAD (GENERALIZED ANXIETY DISORDER): ICD-10-CM

## 2024-06-12 RX ORDER — SERTRALINE HYDROCHLORIDE 100 MG/1
100 TABLET, FILM COATED ORAL DAILY
Qty: 90 TABLET | Refills: 1 | Status: SHIPPED | OUTPATIENT
Start: 2024-06-12

## 2024-06-12 RX ORDER — LORAZEPAM 0.5 MG/1
0.5 TABLET ORAL EVERY 6 HOURS PRN
Qty: 5 TABLET | Refills: 0 | Status: SHIPPED | OUTPATIENT
Start: 2024-06-12

## 2024-06-12 NOTE — TELEPHONE ENCOUNTER
Returned call to patient. Notes that she is feeling really anxious with her biopsy coming up, she is struggling. She is having anxiety surrounding her parents health as well. States that this has been going on for awhile. States taht the Zoloft is not as helpful as it originally was.     DO [facility]  6/12/2024 3:15 PM

## 2024-06-12 NOTE — LETTER
June 12, 2024     Patient: Mayte Lima  YOB: 1970  Date of Visit: 6/12/2024      To Whom it May Concern:    Mayte Lima is under my professional care. Mayte  should be excused from work for today (6/12/24) and tomorrow (6/13/24) with planned return to work on 6/14/24.     If you have any questions or concerns, please don't hesitate to call.         Sincerely,          Melony Samaniego D.O        CC: No Recipients

## 2024-06-12 NOTE — TELEPHONE ENCOUNTER
Patient called and is asking if Melony Samaniego can call her today.   She is having extreme anxiety and the Zoloft she is on is not helping.  She has a biopsy on her thyroid tomorrow and checking for cancer which has her stressed out.  She is asking for a phone call and another medication for anxiety.

## 2024-06-13 ENCOUNTER — HOSPITAL ENCOUNTER (OUTPATIENT)
Dept: ULTRASOUND IMAGING | Facility: HOSPITAL | Age: 54
End: 2024-06-13
Attending: STUDENT IN AN ORGANIZED HEALTH CARE EDUCATION/TRAINING PROGRAM
Payer: COMMERCIAL

## 2024-06-13 DIAGNOSIS — E04.2 MULTIPLE THYROID NODULES: ICD-10-CM

## 2024-06-13 PROCEDURE — 88173 CYTOPATH EVAL FNA REPORT: CPT | Performed by: PATHOLOGY

## 2024-06-13 PROCEDURE — 10005 FNA BX W/US GDN 1ST LES: CPT

## 2024-06-13 PROCEDURE — 88172 CYTP DX EVAL FNA 1ST EA SITE: CPT | Performed by: PATHOLOGY

## 2024-06-13 RX ORDER — LIDOCAINE HYDROCHLORIDE 10 MG/ML
5 INJECTION, SOLUTION EPIDURAL; INFILTRATION; INTRACAUDAL; PERINEURAL ONCE
Status: DISCONTINUED | OUTPATIENT
Start: 2024-06-13 | End: 2024-06-17 | Stop reason: HOSPADM

## 2024-06-13 RX ORDER — LOSARTAN POTASSIUM 25 MG/1
TABLET ORAL
Qty: 90 TABLET | Refills: 1 | Status: SHIPPED | OUTPATIENT
Start: 2024-06-13

## 2024-06-17 ENCOUNTER — TELEPHONE (OUTPATIENT)
Age: 54
End: 2024-06-17

## 2024-06-17 NOTE — TELEPHONE ENCOUNTER
Patient was looking to see how long it will take for her biopsy of her thyroid to come back. I told her this week or next week at the latest the results should be back. Please call patient with an update.

## 2024-06-18 ENCOUNTER — TELEPHONE (OUTPATIENT)
Age: 54
End: 2024-06-18

## 2024-06-18 ENCOUNTER — OFFICE VISIT (OUTPATIENT)
Dept: PHYSICAL THERAPY | Facility: CLINIC | Age: 54
End: 2024-06-18
Payer: COMMERCIAL

## 2024-06-18 DIAGNOSIS — M17.0 OSTEOARTHRITIS OF PATELLOFEMORAL JOINTS OF BOTH KNEES: ICD-10-CM

## 2024-06-18 DIAGNOSIS — M16.11 PRIMARY OSTEOARTHRITIS OF ONE HIP, RIGHT: Primary | ICD-10-CM

## 2024-06-18 DIAGNOSIS — M54.16 RADICULOPATHY, LUMBAR REGION: ICD-10-CM

## 2024-06-18 PROCEDURE — 97110 THERAPEUTIC EXERCISES: CPT

## 2024-06-18 PROCEDURE — 88173 CYTOPATH EVAL FNA REPORT: CPT | Performed by: PATHOLOGY

## 2024-06-18 PROCEDURE — 97112 NEUROMUSCULAR REEDUCATION: CPT

## 2024-06-18 PROCEDURE — 88172 CYTP DX EVAL FNA 1ST EA SITE: CPT | Performed by: PATHOLOGY

## 2024-06-18 NOTE — PROGRESS NOTES
"Daily Note     Today's date: 2024  Patient name: Mayte Lima  : 1970  MRN: [id number]  Referring provider: Josh Macedo MD  Dx:   Encounter Diagnosis     ICD-10-CM    1. Primary osteoarthritis of one hip, right  M16.11       2. Radiculopathy, lumbar region  M54.16       3. Osteoarthritis of patellofemoral joints of both knees  M17.0           Start Time: 1632  Stop Time: 1708  Total time in clinic (min): 36 minutes    Subjective: Pt reports that she is very fatigued and that her entire body is in pain today.       Objective: See treatment diary below      Assessment: Tolerated treatment fair. Initiated strengthening program. Provided Pt w/ HEP and educated Pt on HEP. Pt reported fatigue and soreness following most exercises, required cueing for most exercises. Pt had UE pain while performing multifidus press, DC exercises and replaced it w/ dead bugs. Pt demonstrated good TA contraction during dead bugs. Pt has less hip abduction on the R compared to L. Patient demonstrated fatigue post treatment, exhibited good technique with therapeutic exercises, and would benefit from continued PT for increased mobility, increased strength, and decreased symptom irritability.       Plan: Continue per plan of care.  Progress treatment as tolerated.       Precautions: High BP, previous LE surgeries, depression, graves disease    POC expires Unit limit Auth Expiration date PT/OT/ST + Visit Limit?   10/24/24 3 N/A 60                           Visit/Unit Tracking  AUTH Status:  Date              N/A Used 1 2              Remaining                    HEP: [id number]   Manuals                                                                 Neuro Re-Ed             SLR 2x10 ea            Bridge 2x10 3\"            Side lying hip abduction 2x10 ea            Multifidus press x15 ea 10#            Deadbug X15 ea                                      Ther Ex             HEP education 5'            Leg press " 3x10 75#                                                                                          Ther Activity                                       Gait Training                                       Modalities

## 2024-06-21 ENCOUNTER — TELEPHONE (OUTPATIENT)
Age: 54
End: 2024-06-21

## 2024-06-21 DIAGNOSIS — E05.00 GRAVES DISEASE: Primary | ICD-10-CM

## 2024-06-21 NOTE — TELEPHONE ENCOUNTER
Patient wants to see what Dr. Caruso wants to do treatment wise in regards to her biopsy. She is concerned and would like to speak with him over the phone. Please call patient back when possible.

## 2024-06-25 ENCOUNTER — APPOINTMENT (OUTPATIENT)
Dept: PHYSICAL THERAPY | Facility: CLINIC | Age: 54
End: 2024-06-25
Payer: COMMERCIAL

## 2024-06-27 ENCOUNTER — APPOINTMENT (OUTPATIENT)
Dept: PHYSICAL THERAPY | Facility: CLINIC | Age: 54
End: 2024-06-27
Payer: COMMERCIAL

## 2024-08-06 ENCOUNTER — TELEPHONE (OUTPATIENT)
Age: 54
End: 2024-08-06

## 2024-08-06 NOTE — TELEPHONE ENCOUNTER
Hello,    Please advise if a forced appointment can be accommodated for the patient:    Call back #: [phone]    Insurance: [employer]     Reason for appointment: Asked to be seen this week due to the paim    Requested doctor and/or location: [facility]/ [facility]       Thank you.

## 2024-08-07 ENCOUNTER — NURSE TRIAGE (OUTPATIENT)
Age: 54
End: 2024-08-07

## 2024-08-07 ENCOUNTER — OFFICE VISIT (OUTPATIENT)
Age: 54
End: 2024-08-07
Payer: COMMERCIAL

## 2024-08-07 ENCOUNTER — TELEPHONE (OUTPATIENT)
Age: 54
End: 2024-08-07

## 2024-08-07 VITALS
RESPIRATION RATE: 18 BRPM | TEMPERATURE: 97.3 F | BODY MASS INDEX: 38.24 KG/M2 | HEART RATE: 78 BPM | SYSTOLIC BLOOD PRESSURE: 144 MMHG | DIASTOLIC BLOOD PRESSURE: 72 MMHG | WEIGHT: 202.4 LBS | OXYGEN SATURATION: 98 %

## 2024-08-07 DIAGNOSIS — R11.0 NAUSEA: ICD-10-CM

## 2024-08-07 DIAGNOSIS — R05.1 ACUTE COUGH: ICD-10-CM

## 2024-08-07 DIAGNOSIS — A08.4 VIRAL GASTROENTERITIS: Primary | ICD-10-CM

## 2024-08-07 LAB
SARS-COV-2 AG UPPER RESP QL IA: NEGATIVE
VALID CONTROL: NORMAL

## 2024-08-07 PROCEDURE — 99213 OFFICE O/P EST LOW 20 MIN: CPT | Performed by: PHYSICIAN ASSISTANT

## 2024-08-07 PROCEDURE — 87811 SARS-COV-2 COVID19 W/OPTIC: CPT | Performed by: PHYSICIAN ASSISTANT

## 2024-08-07 RX ORDER — ONDANSETRON 4 MG/1
8 TABLET, FILM COATED ORAL EVERY 8 HOURS PRN
Qty: 20 TABLET | Refills: 0 | Status: SHIPPED | OUTPATIENT
Start: 2024-08-07

## 2024-08-07 NOTE — PATIENT INSTRUCTIONS
"Patient Education     Viral gastroenteritis in adults   The Basics   Written by the doctors and editors at [facility]   What is viral gastroenteritis? -- Viral gastroenteritis is an infection that can cause diarrhea and vomiting. It happens when a person's stomach and intestines get infected with a virus (figure 1). One of the most common causes of gastroenteritis is norovirus. But other viruses can cause it, too.  People can get viral gastroenteritis if they:   Touch an infected person or a surface with the virus on it, and then don't wash their hands   Eat foods or drink liquids with the virus in them. If people with the virus don't wash their hands, they can spread it to food or liquids they touch.  What are the symptoms of viral gastroenteritis? -- The infection causes diarrhea and vomiting. People can have either diarrhea or vomiting, or both. These symptoms usually start suddenly, and can be severe.  Viral gastroenteritis can also cause:   Fever   Headache or muscle aches   Belly pain or cramping   Loss of appetite  If you have a lot of diarrhea and vomiting, your body can lose too much water. This is known as \"dehydration.\" Dehydration can make you feel thirsty, tired, dizzy, or even confused. It can also make your urine look dark yellow.  Severe dehydration can be life-threatening. Older people are more likely to get severe dehydration.  Will I need tests? -- Not usually. Your doctor or nurse should be able to tell if you have viral gastroenteritis by learning about your symptoms and doing an exam. But the doctor or nurse might do tests to check for dehydration or to see which virus is causing the infection. These tests can include:   Blood tests   Urine tests   Tests on a sample of bowel movement  Is there anything I can do on my own to feel better? -- Yes. You need to replace the body's fluids that are lost through vomiting and diarrhea:   Drink fluids when you are able. It might help to take small sips " "every 15 to 30 minutes. Try to drink more as you start to feel better.   When you have a lot of vomiting or diarrhea, your body loses both water and salt. Drinking fluids that contain some salt can help replace what your body has lost. Examples include \"oral rehydration solutions,\" sports drinks, and broth. If you drink a lot of plain water, make sure you are also eating. This will help your body keep the right salt and water balance.   Avoid drinks with a lot of sugar, like juice or soda. Avoid alcohol, too.   Eat when you are able. If you can keep food down, it's best to eat lean meats, fruits, vegetables, and whole-grain breads and cereals. Avoid eating foods with a lot of fat or sugar, which can make symptoms worse.  If you are an adult younger than 65 and you have a new bout of diarrhea, and no fever and no blood in your bowel movements, you can take medicine to stop diarrhea such as loperamide (brand name: Imodium) for 1 to 2 days. But if you are older than 65, have a fever, or have blood in your bowel movements, do not take these medicines without checking with your doctor.  If you have diabetes, you might need to check your blood sugar more often until you feel better. Ask your doctor or nurse about this.  Should I call the doctor or nurse? -- Call the doctor or nurse if you:   Have any symptoms of dehydration, like feeling very tired, thirsty, dizzy, or confused   Have diarrhea or vomiting that lasts longer than a few days   Vomit up blood, have bloody diarrhea, or have severe belly pain   Haven't been able to drink anything for many hours   Haven't needed to urinate in the past 6 to 8 hours (during the day)  How is viral gastroenteritis treated? -- Most people do not need any treatment, because their symptoms will get better on their own. But people with severe dehydration might need treatment in the hospital. This involves getting fluids through a thin tube that goes into a vein, called an \"IV.\"  Doctors " "do not treat viral gastroenteritis with antibiotics. That's because antibiotics treat infections that are caused by bacteria, not viruses.  Can viral gastroenteritis be prevented? -- Sometimes. To lower the chance of getting or spreading the infection, wash your hands well with soap and water:   After you use the bathroom   Before you eat   Before you prepare food  Also, if you are caring for a child in diapers, wash your hands well after changing diapers. Do not change diapers near where you cook or eat food.  All topics are updated as new evidence becomes available and our peer review process is complete.  This topic retrieved from [employer] on: Mar 06, 2024.  Topic 08365 Version 17.0  Release: 32.2.4 - C32.64  © 2024 UpToDate, Inc. and/or its affiliates. All rights reserved.  figure 1: Digestive system     This drawing shows the organs in the body that process food. Together, these organs are called the \"digestive system\" or \"digestive tract.\" As food travels through this system, the body absorbs nutrients and water.  Graphic 09981 Version 5.0  Consumer Information Use and Disclaimer   Disclaimer: This generalized information is a limited summary of diagnosis, treatment, and/or medication information. It is not meant to be comprehensive and should be used as a tool to help the user understand and/or assess potential diagnostic and treatment options. It does NOT include all information about conditions, treatments, medications, side effects, or risks that may apply to a specific patient. It is not intended to be medical advice or a substitute for the medical advice, diagnosis, or treatment of a health care provider based on the health care provider's examination and assessment of a patient's specific and unique circumstances. Patients must speak with a health care provider for complete information about their health, medical questions, and treatment options, including any risks or benefits regarding use of " medications. This information does not endorse any treatments or medications as safe, effective, or approved for treating a specific patient. UpToDate, Inc. and its affiliates disclaim any warranty or liability relating to this information or the use thereof.The use of this information is governed by the Terms of Use, available at https://www.[employer].com.com/en/know/clinical-effectiveness-terms. 2024© UpToDate, Inc. and its affiliates and/or licensors. All rights reserved.  Copyright   © 2024 UpToDate, Inc. and/or its affiliates. All rights reserved.

## 2024-08-07 NOTE — TELEPHONE ENCOUNTER
Pt just returned from [place] on Sunday. Pt c/o sore throat, abdominal pain, nausea and dry heaves.  Also, c/o diarrhea and chest pressure, indigestion since Monday.  Pt too sick to work today. Asking if you want to order a covid test or if she may have food poisoning.       Warm transfer to nurse triage

## 2024-08-07 NOTE — PROGRESS NOTES
[facility]        NAME: Mayte Lima is a 53 y.o. female  : 1970    MRN: [id number]  DATE: 2024  TIME: 5:32 PM    Assessment and Plan   Viral gastroenteritis [A08.4]  1. Viral gastroenteritis  bismuth subsalicylate (PEPTO BISMOL) 524 mg/30 mL oral suspension      2. Acute cough  Poct Covid 19 Rapid Antigen Test      3. Nausea  ondansetron (ZOFRAN) 4 mg tablet            Patient Instructions     Rapid COVID was negative    I suspect viral gastroenteritis  Bay diet  Rest  Increase fluid intake  Pepto-Bismol as directed  Zofran as needed  Probiotic as discussed    Follow up with PCP in 3-5 days.  Proceed to  ER if symptoms worsen.    If tests are performed, our office will contact you with results only if changes need to made to the care plan discussed with you at the visit. [facility]    Chief Complaint     Chief Complaint   Patient presents with    Cold Like Symptoms     Symptom started 3 days ago. C/oo Sore throat, Stomach ache, hard time breathing  and fatigue,          History of Present Illness       53-year-old female with past medical history of perimenopausal, hot flashes, presenting with complaint of nausea, 1-2 episodes of diarrhea, feeling malaised and fatigued for approximately 3 to 4 days.  Patient returned from [place] and wishes to rule out COVID.        Review of Systems   Review of Systems   Constitutional:  Positive for activity change and fatigue. Negative for appetite change and fever.   Respiratory:  Positive for cough.    Gastrointestinal:  Positive for diarrhea and nausea. Negative for blood in stool and vomiting.   Endocrine: Negative for polyuria.   Genitourinary:  Negative for dysuria, frequency, hematuria, urgency and vaginal discharge.   Musculoskeletal:  Negative for back pain.   Skin:  Negative for rash.   Neurological:  Negative for dizziness, light-headedness and headaches.         Current Medications        Current Outpatient Medications:     acetaminophen (TYLENOL) 500 mg tablet, Take 1,000 mg by mouth every 6 (six) hours as needed for mild pain, Disp: , Rfl:     aspirin-acetaminophen-caffeine (EXCEDRIN MIGRAINE) 250-250-65 MG per tablet, Take 1 tablet by mouth every 6 (six) hours as needed for headaches, Disp: , Rfl:     bismuth subsalicylate (PEPTO BISMOL) 524 mg/30 mL oral suspension, Take 15 mL (262 mg total) by mouth every 6 (six) hours as needed for indigestion, Disp: 360 mL, Rfl: 0    ibuprofen (MOTRIN) 200 mg tablet, Take 400 mg by mouth every 6 (six) hours as needed for mild pain Sometimes 8900mg PRN, Disp: , Rfl:     LORazepam (Ativan) 0.5 mg tablet, Take 1 tablet (0.5 mg total) by mouth every 6 (six) hours as needed for anxiety, Disp: 5 tablet, Rfl: 0    losartan (COZAAR) 25 mg tablet, TAKE 1 TABLET BY MOUTH EVERY DAY, Disp: 90 tablet, Rfl: 1    ondansetron (ZOFRAN) 4 mg tablet, Take 2 tablets (8 mg total) by mouth every 8 (eight) hours as needed for nausea or vomiting, Disp: 20 tablet, Rfl: 0    pantoprazole (PROTONIX) 40 mg tablet, TAKE 1 TABLET BY MOUTH EVERY DAY BEFORE BREAKFAST, Disp: 90 tablet, Rfl: 1    sertraline (Zoloft) 100 mg tablet, Take 1 tablet (100 mg total) by mouth daily, Disp: 90 tablet, Rfl: 1    simvastatin (ZOCOR) 20 mg tablet, TAKE 1 TABLET BY MOUTH EVERY DAY IN THE EVENING, Disp: 90 tablet, Rfl: 1    albuterol (Ventolin HFA) 90 mcg/act inhaler, Inhale 2 puffs every 6 (six) hours as needed for wheezing (Patient not taking: Reported on 4/16/2024), Disp: 18 g, Rfl: 1    cyclobenzaprine (FLEXERIL) 5 mg tablet, Take 1 tablet (5 mg total) by mouth 3 (three) times a day as needed for muscle spasms (Patient not taking: Reported on 1/29/2024), Disp: 15 tablet, Rfl: 0    estradiol (ESTRACE) 2 MG tablet, TAKE 1 TABLET BY MOUTH EVERY DAY (Patient not taking: Reported on 6/28/2023), Disp: 30 tablet, Rfl: 0    famotidine (PEPCID) 20 mg tablet, TAKE 1 TABLET BY MOUTH TWICE A DAY (Patient not  taking: Reported on 2024), Disp: 180 tablet, Rfl: 1    gabapentin (Neurontin) 300 mg capsule, Start with one capsule at bed time. (Patient not taking: Reported on 2024), Disp: 120 capsule, Rfl: 3    hydrochlorothiazide (HYDRODIURIL) 25 mg tablet, TAKE 1 TABLET (25 MG TOTAL) BY MOUTH DAILY., Disp: 90 tablet, Rfl: 1    meclizine (ANTIVERT) 25 mg tablet, Take 1 tablet (25 mg total) by mouth 3 (three) times a day as needed for dizziness (Patient not taking: Reported on 2023), Disp: 30 tablet, Rfl: 0    methylPREDNISolone 4 MG tablet therapy pack, Use as directed on package (Patient not taking: Reported on 2024), Disp: 21 each, Rfl: 0    Current Allergies     Allergies as of 2024    (No Known Allergies)            The following portions of the patient's history were reviewed and updated as appropriate: allergies, current medications, past family history, past medical history, past social history, past surgical history and problem list.     Past Medical History:   Diagnosis Date    Anxiety     Depression     GERD (gastroesophageal reflux disease)     Hypertension     IBS (irritable colon syndrome)     IBS-C    Migraine     Stressed    Nodule of neck     Urinary tract infection        Past Surgical History:   Procedure Laterality Date     SECTION      FRACTURE SURGERY      HIP FRACTURE SURGERY Right     ORIF acetabulum    HYSTERECTOMY      NECK SURGERY      H/o C4-5, C5-6, C6-7 ANTERIOR CERVICAL DISCECTOMY/DECOMPRESSION FUSION, PLATE SCREWS, INTERBODY HARVEST ILIAC CRESR BONE GRAFT AND ASSOSCIATED SURGICAL PROCEDURES/LEVELS w [facility] in     TIBIA FRACTURE SURGERY Right     IM NAIL    US GUIDED THYROID BIOPSY  2024       Family History   Problem Relation Age of Onset    Heart disease Mother     Cervical cancer Mother 31    Dementia Mother     Diabetes type II Father     Cancer Father     No Known Problems Daughter     No Known Problems Maternal Grandmother      No Known Problems Paternal Grandmother     Hypertension Brother     Hyperlipidemia Brother     No Known Problems Half-Sister     No Known Problems Half-Sister     No Known Problems Maternal Aunt     No Known Problems Maternal Aunt     No Known Problems Paternal Aunt     Cancer Paternal Aunt     Breast cancer Cousin 35         Medications have been verified.        Objective   /72   Pulse 78   Temp (!) 97.3 °F (36.3 °C)   Resp 18   Wt 91.8 kg (202 lb 6.4 oz)   SpO2 98%   BMI 38.24 kg/m²        Physical Exam     Physical Exam  Vitals and nursing note reviewed.   Constitutional:       General: She is not in acute distress.     Appearance: Normal appearance. She is normal weight. She is not ill-appearing, toxic-appearing or diaphoretic.   HENT:      Right Ear: Tympanic membrane, ear canal and external ear normal.      Left Ear: Tympanic membrane, ear canal and external ear normal.      Mouth/Throat:      Mouth: Mucous membranes are moist.      Pharynx: Oropharynx is clear.   Eyes:      General: No scleral icterus.     Extraocular Movements: Extraocular movements intact.      Conjunctiva/sclera: Conjunctivae normal.      Pupils: Pupils are equal, round, and reactive to light.   Cardiovascular:      Rate and Rhythm: Normal rate and regular rhythm.      Pulses: Normal pulses.      Heart sounds: Normal heart sounds.   Pulmonary:      Effort: Pulmonary effort is normal. No respiratory distress.      Breath sounds: Normal breath sounds.   Abdominal:      General: Abdomen is flat.      Palpations: There is no mass.      Tenderness: There is no abdominal tenderness. There is no guarding or rebound.      Comments: Positive hyperactive bowel sounds x 4 quadrants on auscultation.   Musculoskeletal:         General: Normal range of motion.      Cervical back: Normal range of motion and neck supple.   Skin:     General: Skin is warm and dry.      Findings: No erythema or lesion.   Neurological:      Mental Status: She  is alert and oriented to person, place, and time.      Coordination: Coordination normal.      Gait: Gait normal.   Psychiatric:         Mood and Affect: Mood normal.         Behavior: Behavior normal.

## 2024-08-07 NOTE — LETTER
August 7, 2024     Patient: Mayte Lima   YOB: 1970   Date of Visit: 8/7/2024       To Whom it May Concern:    Mayte Lima was seen in my clinic on 8/7/2024. She may return to work on 8/8/2024 .    If you have any questions or concerns, please don't hesitate to call.         Sincerely,          Brandon West PA-C        CC: No Recipients

## 2024-08-07 NOTE — TELEPHONE ENCOUNTER
Pt was recently traveling and got home from [place]. She developed GI upset on Saturday/Sunday and later URI symptoms. Pt has had nausea, indigestion, gas, and generally feeling unwell. Pt has appt on Friday for something unrelated but wanted to be tested for covid prior. No appointments available in office today so recommended pt to get evaluated in an urgent care. Pt was agreeable.

## 2024-08-09 ENCOUNTER — TELEPHONE (OUTPATIENT)
Age: 54
End: 2024-08-09

## 2024-08-09 NOTE — TELEPHONE ENCOUNTER
Pt called in requesting to see if she could switch her appt to virtual. She is having issues with her car and might not be able to come in on Friday. Please call pt and advise

## 2024-08-13 ENCOUNTER — APPOINTMENT (OUTPATIENT)
Age: 54
End: 2024-08-13
Payer: COMMERCIAL

## 2024-08-13 LAB
T3 SERPL-MCNC: 1.2 NG/ML
T4 FREE SERPL-MCNC: 0.78 NG/DL (ref 0.61–1.12)
TSH SERPL DL<=0.05 MIU/L-ACNC: 0.55 UIU/ML (ref 0.45–4.5)

## 2024-08-16 ENCOUNTER — TELEMEDICINE (OUTPATIENT)
Dept: ENDOCRINOLOGY | Facility: CLINIC | Age: 54
End: 2024-08-16
Payer: COMMERCIAL

## 2024-08-16 ENCOUNTER — TELEPHONE (OUTPATIENT)
Dept: FAMILY MEDICINE CLINIC | Facility: CLINIC | Age: 54
End: 2024-08-16

## 2024-08-16 DIAGNOSIS — E04.2 MULTIPLE THYROID NODULES: ICD-10-CM

## 2024-08-16 DIAGNOSIS — E05.90 SUBCLINICAL HYPERTHYROIDISM: Primary | ICD-10-CM

## 2024-08-16 PROCEDURE — 99214 OFFICE O/P EST MOD 30 MIN: CPT | Performed by: STUDENT IN AN ORGANIZED HEALTH CARE EDUCATION/TRAINING PROGRAM

## 2024-08-16 NOTE — TELEPHONE ENCOUNTER
Attempted to contact patient after virtual visit with Dr. Caruso, was unable to get through to leave .     Patient needs 6 month follow up scheduled. Labs and US were also ordered.

## 2024-08-16 NOTE — PROGRESS NOTES
Virtual Regular Visit  Name: Mayte Lima      : 1970      MRN: [id number]  Encounter Provider: Abhi Caruso MD  Encounter Date: 2024   Encounter department: [facility] [place]    Verification of patient location:    Patient is located at Home in the following state in which I hold an active license [place]    Assessment & Plan   1. Subclinical hyperthyroidism  Assessment & Plan:  Negative TRAB, and negative thyroid uptake and scan for toxic adenoma, subsequently normalization of TFT in favor of thyroiditis, we will continue monitor thyroid function test.    Orders:  -     T4, free; Future  -     TSH, 3rd generation; Future  -     T3; Future  2. Multiple thyroid nodules  Assessment & Plan:  LEFT lower pole nodule measuring 3.1 x 2 x 1.9 cm. With benign biopsy,  LEFT midgland nodule measuring 0.7 x 0.4 x 0.6 cm   We will repeat ultrasound in one year.   Orders:  -     US thyroid; Future; Expected date: 2025        Encounter provider Abhi Caruso MD    The patient was identified by name and date of birth. Mayte Lima was informed that this is a telemedicine visit and that the visit is being conducted through the [employer] platform. She agrees to proceed..  My office door was closed. No one else was in the room.  She acknowledged consent and understanding of privacy and security of the video platform. The patient has agreed to participate and understands they can discontinue the visit at any time.    Patient is aware this is a billable service.     History of Present Illness     Mayte Lima is a 53 y.o. female who presents for a routine follow up for thyroid nodules and hyperthyroidism.      Review of Systems   Constitutional:  Negative for appetite change, fatigue and unexpected weight change.   HENT:  Negative for trouble swallowing and voice change.    Cardiovascular:  Negative for chest pain and palpitations.   Gastrointestinal:  Negative for abdominal pain,  constipation, diarrhea, nausea and vomiting.   Endocrine: Negative for cold intolerance, heat intolerance, polydipsia, polyphagia and polyuria.     Medical History Reviewed by provider this encounter:       Current Outpatient Medications on File Prior to Visit   Medication Sig Dispense Refill    acetaminophen (TYLENOL) 500 mg tablet Take 1,000 mg by mouth every 6 (six) hours as needed for mild pain      aspirin-acetaminophen-caffeine (EXCEDRIN MIGRAINE) 250-250-65 MG per tablet Take 1 tablet by mouth every 6 (six) hours as needed for headaches      bismuth subsalicylate (PEPTO BISMOL) 524 mg/30 mL oral suspension Take 15 mL (262 mg total) by mouth every 6 (six) hours as needed for indigestion 360 mL 0    ibuprofen (MOTRIN) 200 mg tablet Take 400 mg by mouth every 6 (six) hours as needed for mild pain Sometimes 8900mg PRN      losartan (COZAAR) 25 mg tablet TAKE 1 TABLET BY MOUTH EVERY DAY 90 tablet 1    sertraline (Zoloft) 100 mg tablet Take 1 tablet (100 mg total) by mouth daily 90 tablet 1    albuterol (Ventolin HFA) 90 mcg/act inhaler Inhale 2 puffs every 6 (six) hours as needed for wheezing (Patient not taking: Reported on 4/16/2024) 18 g 1    cyclobenzaprine (FLEXERIL) 5 mg tablet Take 1 tablet (5 mg total) by mouth 3 (three) times a day as needed for muscle spasms (Patient not taking: Reported on 1/29/2024) 15 tablet 0    estradiol (ESTRACE) 2 MG tablet TAKE 1 TABLET BY MOUTH EVERY DAY (Patient not taking: Reported on 6/28/2023) 30 tablet 0    famotidine (PEPCID) 20 mg tablet TAKE 1 TABLET BY MOUTH TWICE A DAY (Patient not taking: Reported on 1/29/2024) 180 tablet 1    gabapentin (Neurontin) 300 mg capsule Start with one capsule at bed time. (Patient not taking: Reported on 4/16/2024) 120 capsule 3    meclizine (ANTIVERT) 25 mg tablet Take 1 tablet (25 mg total) by mouth 3 (three) times a day as needed for dizziness (Patient not taking: Reported on 6/28/2023) 30 tablet 0    methylPREDNISolone 4 MG tablet  therapy pack Use as directed on package (Patient not taking: Reported on 1/29/2024) 21 each 0    ondansetron (ZOFRAN) 4 mg tablet Take 2 tablets (8 mg total) by mouth every 8 (eight) hours as needed for nausea or vomiting (Patient not taking: Reported on 8/16/2024) 20 tablet 0     No current facility-administered medications on file prior to visit.      Objective     There were no vitals taken for this visit.  Physical Exam  Vitals and nursing note reviewed.   Constitutional:       General: She is not in acute distress.     Appearance: She is well-developed.   HENT:      Head: Normocephalic and atraumatic.   Eyes:      Conjunctiva/sclera: Conjunctivae normal.   Pulmonary:      Effort: Pulmonary effort is normal. No respiratory distress.   Abdominal:      Palpations: Abdomen is soft.   Skin:     Capillary Refill: Capillary refill takes less than 2 seconds.   Neurological:      Mental Status: She is alert and oriented to person, place, and time.       Component      Latest Ref Rng 5/10/2024 8/13/2024   Miscellaneous Lab Test Result see written report     FREE T4      0.61 - 1.12 ng/dL  0.78    TSH 3RD GENERATON      0.450 - 4.500 uIU/mL  0.551    T3, Total      0.9-1.8 ng/mL ng/mL  1.2        Final Diagnosis   A-B.  Thyroid, left lower (fine needle aspiration):     - Benign ([place] Category II) - See note.     - Adenomatoid nodule.        Visit Time  THYROID ULTRASOUND     INDICATION: E04.1: Nontoxic single thyroid nodule.     COMPARISON: None     TECHNIQUE: Ultrasound of the thyroid was performed with a high frequency linear transducer in transverse and sagittal planes including volumetric imaging sweeps as well as traditional still imaging technique.     FINDINGS: Normal homogeneous smooth echotexture.     Right lobe: 5.2 x 1.5 x 1.9 cm. Volume 7.1 mL  Left lobe: 5.9 x 1.8 x 2.1 cm. Volume 10.9 mL  Isthmus: 0.3 cm.     Nodule #1.  Image 34.  LEFT midgland nodule measuring 0.7 x 0.4 x 0.6 cm. No priors for  comparison.  COMPOSITION: 2 points, solid or almost completely solid.  ECHOGENICITY: 2 points, hypoechoic.  SHAPE: 0 points, wider-than-tall.  MARGIN: 0 points, smooth.  ECHOGENIC FOCI: 3 points, punctate echogenic foci. These are better visualized on the cine images.  TI-RADS Classification: TR 5 (7 or > points), Highly suspicious.  FNA if > 1 cm.  Follow if > 0.5 cm.     Nodule #2.  Image 28.  LEFT lower pole nodule measuring 3.1 x 2 x 1.9 cm. No priors for comparison.  COMPOSITION: 2 points, solid or almost completely solid.  ECHOGENICITY: 1 point, hyperechoic or isoechoic.  SHAPE: 0 points, wider-than-tall.  MARGIN: 0 points, ill-defined.  ECHOGENIC FOCI: 0 points, none or large comet-tail artifacts.  TI-RADS Classification: TR 3 (3 points), FNA if >2.5 cm.  Follow if >1.5 cm.     There are additional nodules of lesser size and/or TI-RADS score.  These do not necessitate additional evaluation based on ACR criteria.     IMPRESSION:     Multinodular thyroid gland. The following meet current ACR criteria for recommending ultrasound guided biopsy:     Left lower pole TR 3 nodule.     12-month ultrasound follow-up is also advised for the left mid gland TR 5 nodule.     Reference: ACR Thyroid Imaging, Reporting and Data System (TI-RADS): White Paper of the ACR TI-RADS Committee. J AM Andres Radiol 2017;14:587-595. (additional recommendations based on American Thyroid Association 2015 guidelines.)     This study demonstrates a significant  finding and was documented as such in [facility] for liaison and referring practitioner notification.     This study demonstrates a finding requiring biopsy and imaging follow-up and was documented as such in Epic.

## 2024-08-20 DIAGNOSIS — E78.2 MIXED HYPERLIPIDEMIA: ICD-10-CM

## 2024-08-20 DIAGNOSIS — I10 PRIMARY HYPERTENSION: ICD-10-CM

## 2024-08-21 DIAGNOSIS — K29.60 EROSIVE GASTRITIS: ICD-10-CM

## 2024-08-21 DIAGNOSIS — F41.0 PANIC ATTACKS: ICD-10-CM

## 2024-08-21 RX ORDER — LORAZEPAM 0.5 MG/1
0.5 TABLET ORAL EVERY 6 HOURS PRN
Qty: 5 TABLET | Refills: 0 | Status: SHIPPED | OUTPATIENT
Start: 2024-08-21

## 2024-08-21 RX ORDER — PANTOPRAZOLE SODIUM 40 MG/1
40 TABLET, DELAYED RELEASE ORAL
Qty: 90 TABLET | Refills: 1 | Status: SHIPPED | OUTPATIENT
Start: 2024-08-21

## 2024-08-21 RX ORDER — HYDROCHLOROTHIAZIDE 25 MG/1
25 TABLET ORAL DAILY
Qty: 90 TABLET | Refills: 2 | Status: SHIPPED | OUTPATIENT
Start: 2024-08-21

## 2024-08-21 RX ORDER — SIMVASTATIN 20 MG
TABLET ORAL
Qty: 90 TABLET | Refills: 1 | Status: SHIPPED | OUTPATIENT
Start: 2024-08-21

## 2024-08-21 NOTE — TELEPHONE ENCOUNTER
Patient is requesting if Zoloft can be reduced back to 50mg. Patient stated 100 mg is too much.     Please advise.      Medication: Lorazepam     Dose/Frequency: 0.5 mg tab     Quantity:     Pharmacy: [facility] #[id number]    Office:   [x] PCP/Provider - Dr Samaniego   [] Speciality/Provider -     Does the patient have enough for 3 days?   [] Yes   [x] No - Send as HP to POD    Medication: Pantoprazole     Dose/Frequency: 40 mg     Quantity: 90    Pharmacy: [facility] #[id number]    Office:   [] PCP/Provider -   [x] Speciality/Provider - Dr. Ryan Martínez     Does the patient have enough for 3 days?   [] Yes   [x] No - Send as HP to POD

## 2024-08-23 NOTE — ASSESSMENT & PLAN NOTE
LEFT lower pole nodule measuring 3.1 x 2 x 1.9 cm. With benign biopsy,  LEFT midgland nodule measuring 0.7 x 0.4 x 0.6 cm   We will repeat ultrasound in one year.

## 2024-08-23 NOTE — ASSESSMENT & PLAN NOTE
Negative TRAB, and negative thyroid uptake and scan for toxic adenoma, subsequently normalization of TFT in favor of thyroiditis, we will continue monitor thyroid function test.

## 2024-10-04 ENCOUNTER — RA CDI HCC (OUTPATIENT)
Dept: OTHER | Facility: HOSPITAL | Age: 54
End: 2024-10-04

## 2024-10-04 NOTE — PROGRESS NOTES
HCC coding opportunities          Chart Reviewed number of suggestions sent to Provider: 1  E66.01     Patients Insurance        Commercial Insurance: "[employer] Insurance

## 2024-10-10 NOTE — PROGRESS NOTES
Adult Annual Physical  Name: Mayte Lima      : 1970      MRN: [id number]  Encounter Provider: Melony Samaniego DO  Encounter Date: 10/11/2024   Encounter department: [facility] [address]    Assessment & Plan  Osteoarthritis of patellofemoral joints of both knees         Annual physical exam    Orders:    CBC and differential; Future    Comprehensive metabolic panel; Future    Lipid panel; Future    Encounter for screening mammogram for breast cancer    Orders:    Mammo screening bilateral w 3d and cad; Future    Panic attacks    Orders:    LORazepam (Ativan) 0.5 mg tablet; Take 1 tablet (0.5 mg total) by mouth every 6 (six) hours as needed for anxiety    Encounter for Papanicolaou smear for cervical cancer screening  Cervix not visualized, removed with hysterectomy. Vaginal pap completed.   Orders:    Liquid-based pap, screening; Future    Liquid-based pap, screening    Encounter for osteoporosis screening in asymptomatic postmenopausal patient    Orders:    DXA bone density spine hip and pelvis; Future    SIMON (generalized anxiety disorder)    Orders:    sertraline (ZOLOFT) 50 mg tablet; Take 1 tablet (50 mg total) by mouth daily    Hot flashes due to menopause    Orders:    Ambulatory Referral to Obstetrics / Gynecology; Future    Chest pressure  Risk for CAD include tobacco use, obesity, HTN and history of hormone replacement.   Orders:    Echo stress test, dobutamine; Future    Primary hypertension    Orders:    Echo stress test, dobutamine; Future    Immunizations and preventive care screenings were discussed with patient today. Appropriate education was printed on patient's after visit summary.    Counseling:  Alcohol/drug use: discussed moderation in alcohol intake, the recommendations for healthy alcohol use, and avoidance of illicit drug use.  Dental Health: discussed importance of regular tooth brushing, flossing, and dental visits.  Sexual health:  discussed sexually transmitted diseases, partner selection, use of condoms, avoidance of unintended pregnancy, and contraceptive alternatives.  Exercise: the importance of regular exercise/physical activity was discussed. Recommend exercise 3-5 times per week for at least 30 minutes.       Depression Screening and Follow-up Plan: Patient's depression screening was positive with a PHQ-9 score of 10. Patient assessed for underlying major depression. Brief counseling provided and recommend additional follow-up/re-evaluation next office visit.       History of Present Illness     Adult Annual Physical:  Patient presents for annual physical. Has a new job, 12 hr days 4 days per week and then 8 hours on 5th day.     Having issues with brain fog and hot flashes due to menopause. States that she tried OTC options without much help.     Notes that she has pressure in her chest in the center and under her breasts. States that this occurs at any time of the day. Notes that this seems worse when stressed. Sometimes associated with SOB. Notes that her mother has a pacemaker. Unsure of other family history. Notes that she had a stress test years ago, unsure of results. .     Diet and Physical Activity:  - Diet/Nutrition: poor diet. scattered meals, eating late at night.  - Exercise: no formal exercise.    Depression Screening:    - PHQ-9 Score: 10    General Health:  - Sleep:. 6-7 hours nightly on average  - Hearing: normal hearing bilateral ears.  - Vision: most recent eye exam > 1 year ago, goes for regular eye exams and wears glasses. states that she needs updated script  - Dental: no dental visits for > 1 year and brushes teeth twice daily. flossing intermittently    /GYN Health:  - Follows with GYN: no.   - Menopause: postmenopausal.   - Contraception:. hysterectomy      Review of Systems      Objective     /76 (BP Location: Left arm, Patient Position: Sitting, Cuff Size: Large)   Pulse 92   Temp (!) 96.5 °F (35.8  "°C) (Tympanic)   Ht 5' 1\" (1.549 m)   Wt 93.9 kg (207 lb)   SpO2 98%   BMI 39.11 kg/m²     Physical Exam  Vitals reviewed. Exam conducted with a chaperone present (Maricruz Veliz).   Constitutional:       General: She is not in acute distress.     Appearance: Normal appearance. She is obese.   HENT:      Head: Normocephalic and atraumatic.      Right Ear: External ear normal.      Left Ear: External ear normal.      Nose: Nose normal.      Mouth/Throat:      Mouth: Mucous membranes are moist.   Eyes:      Extraocular Movements: Extraocular movements intact.      Conjunctiva/sclera: Conjunctivae normal.      Pupils: Pupils are equal, round, and reactive to light.   Cardiovascular:      Rate and Rhythm: Normal rate and regular rhythm.      Heart sounds: Normal heart sounds.   Pulmonary:      Effort: Pulmonary effort is normal.      Breath sounds: Normal breath sounds.   Abdominal:      General: Bowel sounds are normal. There is no distension.      Palpations: Abdomen is soft.      Tenderness: There is no abdominal tenderness.      Hernia: There is no hernia in the left inguinal area or right inguinal area.   Genitourinary:     Pubic Area: No rash.       Labia:         Right: No rash.         Left: No rash.       Urethra: No prolapse, urethral pain, urethral swelling or urethral lesion.      Vagina: Normal.      Uterus: Absent.       Comments: Absents cervix and B/L ovaries  Musculoskeletal:      Right lower leg: No edema.      Left lower leg: No edema.   Lymphadenopathy:      Lower Body: No right inguinal adenopathy. No left inguinal adenopathy.   Skin:     General: Skin is warm.      Capillary Refill: Capillary refill takes less than 2 seconds.      Findings: No rash.   Neurological:      Mental Status: She is alert. Mental status is at baseline.           DO Heath Crowe [facility]  10/11/2024 11:40 AM      "

## 2024-10-10 NOTE — PATIENT INSTRUCTIONS
"Patient Education     Routine physical for adults   The Basics   Written by the doctors and editors at [facility]   What is a physical? -- A physical is a routine visit, or \"check-up,\" with your doctor. You might also hear it called a \"wellness visit\" or \"preventive visit.\"  During each visit, the doctor will:   Ask about your physical and mental health   Ask about your habits, behaviors, and lifestyle   Do an exam   Give you vaccines if needed   Talk to you about any medicines you take   Give advice about your health   Answer your questions  Getting regular check-ups is an important part of taking care of your health. It can help your doctor find and treat any problems you have. But it's also important for preventing health problems.  A routine physical is different from a \"sick visit.\" A sick visit is when you see a doctor because of a health concern or problem. Since physicals are scheduled ahead of time, you can think about what you want to ask the doctor.  How often should I get a physical? -- It depends on your age and health. In general, for people age 21 years and older:   If you are younger than 50 years, you might be able to get a physical every 3 years.   If you are 50 years or older, your doctor might recommend a physical every year.  If you have an ongoing health condition, like diabetes or high blood pressure, your doctor will probably want to see you more often.  What happens during a physical? -- In general, each visit will include:   Physical exam - The doctor or nurse will check your height, weight, heart rate, and blood pressure. They will also look at your eyes and ears. They will ask about how you are feeling and whether you have any symptoms that bother you.   Medicines - It's a good idea to bring a list of all the medicines you take to each doctor visit. Your doctor will talk to you about your medicines and answer any questions. Tell them if you are having any side effects that bother you. You " "should also tell them if you are having trouble paying for any of your medicines.   Habits and behaviors - This includes:   Your diet   Your exercise habits   Whether you smoke, drink alcohol, or use drugs   Whether you are sexually active   Whether you feel safe at home  Your doctor will talk to you about things you can do to improve your health and lower your risk of health problems. They will also offer help and support. For example, if you want to quit smoking, they can give you advice and might prescribe medicines. If you want to improve your diet or get more physical activity, they can help you with this, too.   Lab tests, if needed - The tests you get will depend on your age and situation. For example, your doctor might want to check your:   Cholesterol   Blood sugar   Iron level   Vaccines - The recommended vaccines will depend on your age, health, and what vaccines you already had. Vaccines are very important because they can prevent certain serious or deadly infections.   Discussion of screening - \"Screening\" means checking for diseases or other health problems before they cause symptoms. Your doctor can recommend screening based on your age, risk, and preferences. This might include tests to check for:   Cancer, such as breast, prostate, cervical, ovarian, colorectal, prostate, lung, or skin cancer   Sexually transmitted infections, such as chlamydia and gonorrhea   Mental health conditions like depression and anxiety  Your doctor will talk to you about the different types of screening tests. They can help you decide which screenings to have. They can also explain what the results might mean.   Answering questions - The physical is a good time to ask the doctor or nurse questions about your health. If needed, they can refer you to other doctors or specialists, too.  Adults older than 65 years often need other care, too. As you get older, your doctor will talk to you about:   How to prevent falling at " home   Hearing or vision tests   Memory testing   How to take your medicines safely   Making sure that you have the help and support you need at home  All topics are updated as new evidence becomes available and our peer review process is complete.  This topic retrieved from [employer] on: May 02, 2024.  Topic 317376 Version 1.0  Release: 32.4.3 - C32.122  © 2024 UpToDate, Inc. and/or its affiliates. All rights reserved.  Consumer Information Use and Disclaimer   Disclaimer: This generalized information is a limited summary of diagnosis, treatment, and/or medication information. It is not meant to be comprehensive and should be used as a tool to help the user understand and/or assess potential diagnostic and treatment options. It does NOT include all information about conditions, treatments, medications, side effects, or risks that may apply to a specific patient. It is not intended to be medical advice or a substitute for the medical advice, diagnosis, or treatment of a health care provider based on the health care provider's examination and assessment of a patient's specific and unique circumstances. Patients must speak with a health care provider for complete information about their health, medical questions, and treatment options, including any risks or benefits regarding use of medications. This information does not endorse any treatments or medications as safe, effective, or approved for treating a specific patient. UpToDate, Inc. and its affiliates disclaim any warranty or liability relating to this information or the use thereof.The use of this information is governed by the Terms of Use, available at https://www.wolters[employer]ive Alliesuwer.com/en/know/clinical-effectiveness-terms. 2024© UpToDate, Inc. and its affiliates and/or licensors. All rights reserved.  Copyright   © 2024 UpToDate, Inc. and/or its affiliates. All rights reserved.

## 2024-10-11 ENCOUNTER — OFFICE VISIT (OUTPATIENT)
Dept: FAMILY MEDICINE CLINIC | Facility: CLINIC | Age: 54
End: 2024-10-11
Payer: COMMERCIAL

## 2024-10-11 VITALS
BODY MASS INDEX: 39.08 KG/M2 | SYSTOLIC BLOOD PRESSURE: 116 MMHG | DIASTOLIC BLOOD PRESSURE: 76 MMHG | OXYGEN SATURATION: 98 % | WEIGHT: 207 LBS | HEIGHT: 61 IN | TEMPERATURE: 96.5 F | HEART RATE: 92 BPM

## 2024-10-11 DIAGNOSIS — Z12.4 ENCOUNTER FOR PAPANICOLAOU SMEAR FOR CERVICAL CANCER SCREENING: ICD-10-CM

## 2024-10-11 DIAGNOSIS — R07.89 CHEST PRESSURE: ICD-10-CM

## 2024-10-11 DIAGNOSIS — F41.0 PANIC ATTACKS: ICD-10-CM

## 2024-10-11 DIAGNOSIS — Z00.00 ANNUAL PHYSICAL EXAM: Primary | ICD-10-CM

## 2024-10-11 DIAGNOSIS — Z12.31 ENCOUNTER FOR SCREENING MAMMOGRAM FOR BREAST CANCER: ICD-10-CM

## 2024-10-11 DIAGNOSIS — F41.1 GAD (GENERALIZED ANXIETY DISORDER): ICD-10-CM

## 2024-10-11 DIAGNOSIS — M17.0 OSTEOARTHRITIS OF PATELLOFEMORAL JOINTS OF BOTH KNEES: ICD-10-CM

## 2024-10-11 DIAGNOSIS — I10 PRIMARY HYPERTENSION: ICD-10-CM

## 2024-10-11 DIAGNOSIS — Z13.820 ENCOUNTER FOR OSTEOPOROSIS SCREENING IN ASYMPTOMATIC POSTMENOPAUSAL PATIENT: ICD-10-CM

## 2024-10-11 DIAGNOSIS — N95.1 HOT FLASHES DUE TO MENOPAUSE: ICD-10-CM

## 2024-10-11 DIAGNOSIS — Z78.0 ENCOUNTER FOR OSTEOPOROSIS SCREENING IN ASYMPTOMATIC POSTMENOPAUSAL PATIENT: ICD-10-CM

## 2024-10-11 PROBLEM — Z90.710 S/P HYSTERECTOMY: Status: ACTIVE | Noted: 2024-10-11

## 2024-10-11 PROCEDURE — G0476 HPV COMBO ASSAY CA SCREEN: HCPCS | Performed by: FAMILY MEDICINE

## 2024-10-11 PROCEDURE — 99214 OFFICE O/P EST MOD 30 MIN: CPT | Performed by: FAMILY MEDICINE

## 2024-10-11 PROCEDURE — G0145 SCR C/V CYTO,THINLAYER,RESCR: HCPCS | Performed by: FAMILY MEDICINE

## 2024-10-11 PROCEDURE — 99396 PREV VISIT EST AGE 40-64: CPT | Performed by: FAMILY MEDICINE

## 2024-10-11 RX ORDER — LORAZEPAM 0.5 MG/1
0.5 TABLET ORAL EVERY 6 HOURS PRN
Qty: 5 TABLET | Refills: 0 | Status: SHIPPED | OUTPATIENT
Start: 2024-10-11

## 2024-10-14 DIAGNOSIS — I10 PRIMARY HYPERTENSION: ICD-10-CM

## 2024-10-14 LAB
HPV HR 12 DNA CVX QL NAA+PROBE: NEGATIVE
HPV16 DNA CVX QL NAA+PROBE: POSITIVE
HPV18 DNA CVX QL NAA+PROBE: NEGATIVE

## 2024-10-14 RX ORDER — LOSARTAN POTASSIUM 25 MG/1
TABLET ORAL
Qty: 90 TABLET | Refills: 1 | Status: SHIPPED | OUTPATIENT
Start: 2024-10-14

## 2024-10-15 ENCOUNTER — TELEPHONE (OUTPATIENT)
Age: 54
End: 2024-10-15

## 2024-10-17 ENCOUNTER — TELEPHONE (OUTPATIENT)
Age: 54
End: 2024-10-17

## 2024-10-17 NOTE — TELEPHONE ENCOUNTER
Pt was ref by PCP to see  and she wanted to speak with her regarding if she has to come in or can see her virtually. Provider is booked a year out and pt wants to see her but says she can not wait that long. Asked if someone could call her to further discuss as she thought this referral was for some other reason not what is stated on the referral (hot flashes due to menopause) please call pt to further discuss.

## 2024-10-18 ENCOUNTER — TELEPHONE (OUTPATIENT)
Dept: FAMILY MEDICINE CLINIC | Facility: CLINIC | Age: 54
End: 2024-10-18

## 2024-10-18 ENCOUNTER — APPOINTMENT (OUTPATIENT)
Age: 54
End: 2024-10-18
Payer: COMMERCIAL

## 2024-10-18 DIAGNOSIS — Z00.00 ANNUAL PHYSICAL EXAM: ICD-10-CM

## 2024-10-18 LAB
ALBUMIN SERPL BCG-MCNC: 3.7 G/DL (ref 3.5–5)
ALP SERPL-CCNC: 67 U/L (ref 34–104)
ALT SERPL W P-5'-P-CCNC: 25 U/L (ref 7–52)
ANION GAP SERPL CALCULATED.3IONS-SCNC: 9 MMOL/L (ref 4–13)
AST SERPL W P-5'-P-CCNC: 21 U/L (ref 13–39)
BASOPHILS # BLD AUTO: 0.07 THOUSANDS/ΜL (ref 0–0.1)
BASOPHILS NFR BLD AUTO: 1 % (ref 0–1)
BILIRUB SERPL-MCNC: 0.48 MG/DL (ref 0.2–1)
BUN SERPL-MCNC: 15 MG/DL (ref 5–25)
CALCIUM SERPL-MCNC: 8.8 MG/DL (ref 8.4–10.2)
CHLORIDE SERPL-SCNC: 103 MMOL/L (ref 96–108)
CHOLEST SERPL-MCNC: 160 MG/DL
CO2 SERPL-SCNC: 28 MMOL/L (ref 21–32)
CREAT SERPL-MCNC: 0.64 MG/DL (ref 0.6–1.3)
EOSINOPHIL # BLD AUTO: 0.66 THOUSAND/ΜL (ref 0–0.61)
EOSINOPHIL NFR BLD AUTO: 10 % (ref 0–6)
ERYTHROCYTE [DISTWIDTH] IN BLOOD BY AUTOMATED COUNT: 12.6 % (ref 11.6–15.1)
GFR SERPL CREATININE-BSD FRML MDRD: 100 ML/MIN/1.73SQ M
GLUCOSE P FAST SERPL-MCNC: 88 MG/DL (ref 65–99)
HCT VFR BLD AUTO: 42.2 % (ref 34.8–46.1)
HDLC SERPL-MCNC: 53 MG/DL
HGB BLD-MCNC: 13.8 G/DL (ref 11.5–15.4)
IMM GRANULOCYTES # BLD AUTO: 0.02 THOUSAND/UL (ref 0–0.2)
IMM GRANULOCYTES NFR BLD AUTO: 0 % (ref 0–2)
LAB AP GYN PRIMARY INTERPRETATION: NORMAL
LDLC SERPL CALC-MCNC: 84 MG/DL (ref 0–100)
LYMPHOCYTES # BLD AUTO: 1.53 THOUSANDS/ΜL (ref 0.6–4.47)
LYMPHOCYTES NFR BLD AUTO: 22 % (ref 14–44)
Lab: NORMAL
MCH RBC QN AUTO: 29.8 PG (ref 26.8–34.3)
MCHC RBC AUTO-ENTMCNC: 32.7 G/DL (ref 31.4–37.4)
MCV RBC AUTO: 91 FL (ref 82–98)
MONOCYTES # BLD AUTO: 0.52 THOUSAND/ΜL (ref 0.17–1.22)
MONOCYTES NFR BLD AUTO: 8 % (ref 4–12)
NEUTROPHILS # BLD AUTO: 4.08 THOUSANDS/ΜL (ref 1.85–7.62)
NEUTS SEG NFR BLD AUTO: 59 % (ref 43–75)
NONHDLC SERPL-MCNC: 107 MG/DL
NRBC BLD AUTO-RTO: 0 /100 WBCS
PLATELET # BLD AUTO: 245 THOUSANDS/UL (ref 149–390)
PMV BLD AUTO: 10.3 FL (ref 8.9–12.7)
POTASSIUM SERPL-SCNC: 4 MMOL/L (ref 3.5–5.3)
PROT SERPL-MCNC: 6.4 G/DL (ref 6.4–8.4)
RBC # BLD AUTO: 4.63 MILLION/UL (ref 3.81–5.12)
SODIUM SERPL-SCNC: 140 MMOL/L (ref 135–147)
TRIGL SERPL-MCNC: 116 MG/DL
WBC # BLD AUTO: 6.88 THOUSAND/UL (ref 4.31–10.16)

## 2024-10-18 PROCEDURE — 36415 COLL VENOUS BLD VENIPUNCTURE: CPT

## 2024-10-18 PROCEDURE — 80053 COMPREHEN METABOLIC PANEL: CPT

## 2024-10-18 PROCEDURE — 80061 LIPID PANEL: CPT

## 2024-10-18 PROCEDURE — 85025 COMPLETE CBC W/AUTO DIFF WBC: CPT

## 2024-10-18 NOTE — TELEPHONE ENCOUNTER
Warm transfer received for further assistance -   Pt at the lab, would like to get u/a done while completing  other labs as well, wondering if Dr Samaniego would put the order in - I didn't see a cytology order in the chart.     Per Dr Samaniego - HPV positive, follow up cytology to determine next steps - pt aware Once pap result is received pt will be provided with next pap smear interval or if additional testing is needed. Pap not resulted yet as of now 10/18 8:06 AM, pt insisting on u/a / cytology stating she received a call to go to the lab to complete this. Pt is waiting at the lab/has another appt in 30 min.      Asked if someone could call her back asap     Please review and kindly advise, TY!

## 2024-10-18 NOTE — TELEPHONE ENCOUNTER
Booked patient to schedule patient for NP appointment, added patient to wait list. Patient understood.

## 2024-10-18 NOTE — TELEPHONE ENCOUNTER
Returned call to patient.     No need for urine sample, cytology from pap smear is pending.     Patient voiced understanding.     DO Heath Crowe [facility]  10/18/2024 8:15 AM

## 2024-10-21 ENCOUNTER — TELEPHONE (OUTPATIENT)
Age: 54
End: 2024-10-21

## 2024-10-21 DIAGNOSIS — R87.810 CERVICAL HIGH RISK HPV (HUMAN PAPILLOMAVIRUS) TEST POSITIVE: Primary | ICD-10-CM

## 2024-10-21 NOTE — TELEPHONE ENCOUNTER
Pt returned call regarding recent lab results.  Relayed the following message from PCP:     Melony Samaniego DO  10/21/2024  7:39 AM EDT       Normal cytology on pap. HR HPV +, should have colposcopy with Ob/Gynecology to take a closer look at the cervix. Referral placed.     Melony Samaniego DO  [facility]  10/21/2024 7:37 AM     Pt verbalized understanding.  Pt also asking for PCP to interpret other lab results as well and provide recommendations if any.

## 2024-11-05 ENCOUNTER — TELEPHONE (OUTPATIENT)
Dept: OBGYN CLINIC | Facility: MEDICAL CENTER | Age: 54
End: 2024-11-05

## 2024-11-05 ENCOUNTER — CONSULT (OUTPATIENT)
Dept: OBGYN CLINIC | Facility: MEDICAL CENTER | Age: 54
End: 2024-11-05
Payer: COMMERCIAL

## 2024-11-05 VITALS
BODY MASS INDEX: 38.14 KG/M2 | WEIGHT: 202 LBS | HEIGHT: 61 IN | DIASTOLIC BLOOD PRESSURE: 88 MMHG | SYSTOLIC BLOOD PRESSURE: 136 MMHG

## 2024-11-05 DIAGNOSIS — F17.210 CIGARETTE NICOTINE DEPENDENCE WITHOUT COMPLICATION: ICD-10-CM

## 2024-11-05 DIAGNOSIS — F41.1 GAD (GENERALIZED ANXIETY DISORDER): ICD-10-CM

## 2024-11-05 DIAGNOSIS — B97.7 HPV (HUMAN PAPILLOMA VIRUS) INFECTION: Primary | ICD-10-CM

## 2024-11-05 DIAGNOSIS — Z11.3 SCREENING FOR STD (SEXUALLY TRANSMITTED DISEASE): ICD-10-CM

## 2024-11-05 DIAGNOSIS — R35.0 URINARY FREQUENCY: ICD-10-CM

## 2024-11-05 PROCEDURE — 87591 N.GONORRHOEAE DNA AMP PROB: CPT | Performed by: NURSE PRACTITIONER

## 2024-11-05 PROCEDURE — 87491 CHLMYD TRACH DNA AMP PROBE: CPT | Performed by: NURSE PRACTITIONER

## 2024-11-05 PROCEDURE — 99204 OFFICE O/P NEW MOD 45 MIN: CPT | Performed by: NURSE PRACTITIONER

## 2024-11-05 NOTE — PATIENT INSTRUCTIONS
Return to office to discuss menopausal concerns.   Normal vaginal exam. Surgically absent uterus and cervix. Adnexa are not palpable.   Recommend colposcopy of vagina due to HPV 16.  Reviewed ASCCP guideline recommendations of colposcopy.   STI testing as ordered.   UA UC collected in office today as requested.   Referred to smoking cessation.   Healthy lifestyle recommended.

## 2024-11-05 NOTE — TELEPHONE ENCOUNTER
Message from enrrique lockett left on patient's vm  and placed in myc with instructions and to call back with any further questions
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
## 2024-11-06 ENCOUNTER — TELEPHONE (OUTPATIENT)
Age: 54
End: 2024-11-06

## 2024-11-06 LAB
C TRACH DNA SPEC QL NAA+PROBE: NEGATIVE
N GONORRHOEA DNA SPEC QL NAA+PROBE: NEGATIVE

## 2024-11-06 NOTE — TELEPHONE ENCOUNTER
Patient called in with questions about the colposcopy procedure as recommended. Reviewed with patient procedure, and recommendations for vaginal colposcopy. Patient wants to see Alem Escalante for her Colpo however states she was advised she has no availability until February. Is asking for provider to review this and provide scheduling guidance as she would prefer to see her. OK to leave detailed VM on patients line with this info.
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
## 2024-11-12 ENCOUNTER — PROCEDURE VISIT (OUTPATIENT)
Dept: OBGYN CLINIC | Facility: MEDICAL CENTER | Age: 54
End: 2024-11-12
Payer: COMMERCIAL

## 2024-11-12 VITALS — DIASTOLIC BLOOD PRESSURE: 92 MMHG | WEIGHT: 203 LBS | BODY MASS INDEX: 38.04 KG/M2 | SYSTOLIC BLOOD PRESSURE: 136 MMHG

## 2024-11-12 DIAGNOSIS — R87.811 HUMAN PAPILLOMAVIRUS (HPV) TYPE 16 DNA DETECTED IN VAGINAL SPECIMEN: Primary | ICD-10-CM

## 2024-11-12 PROCEDURE — 57420 EXAM OF VAGINA W/SCOPE: CPT | Performed by: NURSE PRACTITIONER

## 2024-11-12 NOTE — PATIENT INSTRUCTIONS
Normal findings on today exam.   Recommend annual gyn exam.   Discussed importance of consistent care.   Call office with any abnormal vaginal discharge or any bleeding.   Healthy lifestyle recommended.

## 2024-11-15 ENCOUNTER — TELEPHONE (OUTPATIENT)
Age: 54
End: 2024-11-15

## 2024-11-15 NOTE — TELEPHONE ENCOUNTER
Mayte asks that PCP review blood work results from 10/18/2024 labs. She states some of the numbers are out of range, and she is unsure what it means.    She also states her blood pressure has been elevated the past couple days-- 136//116. She states she is under increased stress, has mild headache in the back of her head and is unsure if that is related to stress or her blood pressure. She denies other symptoms. She asks if PCP would like to adjust her blood pressure medications.    Mayte requests a return call today before the end of the day. She sounds nervous on the call. She states she does not want to go the weekend without PCP recommendation.

## 2024-12-03 ENCOUNTER — HOSPITAL ENCOUNTER (OUTPATIENT)
Age: 54
Discharge: HOME | End: 2024-12-03
Payer: COMMERCIAL

## 2024-12-03 VITALS — HEIGHT: 61 IN | BODY MASS INDEX: 38.04 KG/M2

## 2024-12-03 VITALS — HEIGHT: 61 IN | WEIGHT: 203 LBS | BODY MASS INDEX: 38.33 KG/M2

## 2024-12-03 DIAGNOSIS — Z13.820 ENCOUNTER FOR OSTEOPOROSIS SCREENING IN ASYMPTOMATIC POSTMENOPAUSAL PATIENT: ICD-10-CM

## 2024-12-03 DIAGNOSIS — Z78.0 ENCOUNTER FOR OSTEOPOROSIS SCREENING IN ASYMPTOMATIC POSTMENOPAUSAL PATIENT: ICD-10-CM

## 2024-12-03 DIAGNOSIS — Z12.31 ENCOUNTER FOR SCREENING MAMMOGRAM FOR BREAST CANCER: ICD-10-CM

## 2024-12-03 PROCEDURE — 77080 DXA BONE DENSITY AXIAL: CPT

## 2024-12-03 PROCEDURE — 77067 SCR MAMMO BI INCL CAD: CPT

## 2024-12-03 PROCEDURE — 77063 BREAST TOMOSYNTHESIS BI: CPT

## 2024-12-06 ENCOUNTER — RA CDI HCC (OUTPATIENT)
Dept: OTHER | Facility: HOSPITAL | Age: 54
End: 2024-12-06

## 2024-12-06 PROBLEM — U07.1 COVID-19: Status: RESOLVED | Noted: 2023-11-21 | Resolved: 2024-12-06

## 2024-12-06 NOTE — PROGRESS NOTES
HCC coding opportunities          Chart Reviewed number of suggestions sent to Provider: 1     Patients Insurance        Commercial Insurance: [employer]     E66.01

## 2024-12-10 ENCOUNTER — RESULTS FOLLOW-UP (OUTPATIENT)
Dept: FAMILY MEDICINE CLINIC | Facility: CLINIC | Age: 54
End: 2024-12-10

## 2024-12-10 NOTE — RESULT ENCOUNTER NOTE
Negative mammogram, follow up with routine screening in 1 year.     DO Chan [place]eroe [facility]  10/7/2024 2:30 PM

## 2025-01-17 DIAGNOSIS — E78.2 MIXED HYPERLIPIDEMIA: ICD-10-CM

## 2025-01-17 DIAGNOSIS — F41.1 GAD (GENERALIZED ANXIETY DISORDER): ICD-10-CM

## 2025-01-17 RX ORDER — SIMVASTATIN 20 MG
20 TABLET ORAL EVERY EVENING
Qty: 90 TABLET | Refills: 1 | Status: SHIPPED | OUTPATIENT
Start: 2025-01-17

## 2025-01-17 NOTE — TELEPHONE ENCOUNTER
Patient called Rx refill line to verify if this was completed- advised pt refill was approved and sent into pharmacy   
simvastatin (ZOCOR) 20 mg tablet - TAKE 1 TABLET BY MOUTH EVERY DAY IN THE EVENING    [id number] - [facility] [place], [place] - [address]        Authorizing Provider:  Garth Crowe [facility] [address] POD     Needs HP   
26.61

## 2025-02-21 ENCOUNTER — OFFICE VISIT (OUTPATIENT)
Dept: FAMILY MEDICINE CLINIC | Facility: CLINIC | Age: 55
End: 2025-02-21
Payer: COMMERCIAL

## 2025-02-21 ENCOUNTER — TELEPHONE (OUTPATIENT)
Dept: FAMILY MEDICINE CLINIC | Facility: CLINIC | Age: 55
End: 2025-02-21

## 2025-02-21 ENCOUNTER — APPOINTMENT (OUTPATIENT)
Dept: LAB | Facility: HOSPITAL | Age: 55
End: 2025-02-21
Payer: COMMERCIAL

## 2025-02-21 ENCOUNTER — RESULTS FOLLOW-UP (OUTPATIENT)
Dept: FAMILY MEDICINE CLINIC | Facility: CLINIC | Age: 55
End: 2025-02-21

## 2025-02-21 VITALS
SYSTOLIC BLOOD PRESSURE: 122 MMHG | HEIGHT: 61 IN | HEART RATE: 80 BPM | OXYGEN SATURATION: 98 % | BODY MASS INDEX: 38.82 KG/M2 | RESPIRATION RATE: 18 BRPM | DIASTOLIC BLOOD PRESSURE: 76 MMHG | WEIGHT: 205.6 LBS

## 2025-02-21 DIAGNOSIS — I10 PRIMARY HYPERTENSION: ICD-10-CM

## 2025-02-21 DIAGNOSIS — R07.9 CHEST PAIN, UNSPECIFIED TYPE: Primary | ICD-10-CM

## 2025-02-21 DIAGNOSIS — M85.80 OSTEOPENIA, UNSPECIFIED LOCATION: ICD-10-CM

## 2025-02-21 DIAGNOSIS — R00.2 PALPITATIONS: ICD-10-CM

## 2025-02-21 DIAGNOSIS — F32.2 SEVERE MAJOR DEPRESSIVE DISORDER (HCC): ICD-10-CM

## 2025-02-21 DIAGNOSIS — F17.210 CIGARETTE NICOTINE DEPENDENCE WITHOUT COMPLICATION: ICD-10-CM

## 2025-02-21 DIAGNOSIS — E66.9 OBESITY (BMI 30-39.9): ICD-10-CM

## 2025-02-21 DIAGNOSIS — R06.83 SNORING: ICD-10-CM

## 2025-02-21 DIAGNOSIS — R07.9 CHEST PAIN, UNSPECIFIED TYPE: ICD-10-CM

## 2025-02-21 DIAGNOSIS — R53.83 OTHER FATIGUE: ICD-10-CM

## 2025-02-21 DIAGNOSIS — K21.9 GASTROESOPHAGEAL REFLUX DISEASE WITHOUT ESOPHAGITIS: ICD-10-CM

## 2025-02-21 LAB
25(OH)D3 SERPL-MCNC: 25.5 NG/ML (ref 30–100)
ALBUMIN SERPL BCG-MCNC: 4.2 G/DL (ref 3.5–5)
ALP SERPL-CCNC: 72 U/L (ref 34–104)
ALT SERPL W P-5'-P-CCNC: 24 U/L (ref 7–52)
ANION GAP SERPL CALCULATED.3IONS-SCNC: 6 MMOL/L (ref 4–13)
AST SERPL W P-5'-P-CCNC: 22 U/L (ref 13–39)
BASOPHILS # BLD AUTO: 0.06 THOUSANDS/ΜL (ref 0–0.1)
BASOPHILS NFR BLD AUTO: 1 % (ref 0–1)
BILIRUB SERPL-MCNC: 0.48 MG/DL (ref 0.2–1)
BNP SERPL-MCNC: 700 PG/ML (ref 0–100)
BUN SERPL-MCNC: 18 MG/DL (ref 5–25)
CALCIUM SERPL-MCNC: 9.8 MG/DL (ref 8.4–10.2)
CARDIAC TROPONIN I PNL SERPL HS: <2 NG/L (ref 8–18)
CHLORIDE SERPL-SCNC: 106 MMOL/L (ref 96–108)
CO2 SERPL-SCNC: 31 MMOL/L (ref 21–32)
CREAT SERPL-MCNC: 0.71 MG/DL (ref 0.6–1.3)
D DIMER PPP FEU-MCNC: 0.28 UG/ML FEU
EOSINOPHIL # BLD AUTO: 0.58 THOUSAND/ΜL (ref 0–0.61)
EOSINOPHIL NFR BLD AUTO: 8 % (ref 0–6)
ERYTHROCYTE [DISTWIDTH] IN BLOOD BY AUTOMATED COUNT: 12.9 % (ref 11.6–15.1)
GFR SERPL CREATININE-BSD FRML MDRD: 95 ML/MIN/1.73SQ M
GLUCOSE P FAST SERPL-MCNC: 94 MG/DL (ref 65–99)
HCT VFR BLD AUTO: 43 % (ref 34.8–46.1)
HGB BLD-MCNC: 13.6 G/DL (ref 11.5–15.4)
IMM GRANULOCYTES # BLD AUTO: 0.02 THOUSAND/UL (ref 0–0.2)
IMM GRANULOCYTES NFR BLD AUTO: 0 % (ref 0–2)
LYMPHOCYTES # BLD AUTO: 1.41 THOUSANDS/ΜL (ref 0.6–4.47)
LYMPHOCYTES NFR BLD AUTO: 20 % (ref 14–44)
MCH RBC QN AUTO: 29.2 PG (ref 26.8–34.3)
MCHC RBC AUTO-ENTMCNC: 31.6 G/DL (ref 31.4–37.4)
MCV RBC AUTO: 92 FL (ref 82–98)
MONOCYTES # BLD AUTO: 0.44 THOUSAND/ΜL (ref 0.17–1.22)
MONOCYTES NFR BLD AUTO: 6 % (ref 4–12)
NEUTROPHILS # BLD AUTO: 4.57 THOUSANDS/ΜL (ref 1.85–7.62)
NEUTS SEG NFR BLD AUTO: 65 % (ref 43–75)
NRBC BLD AUTO-RTO: 0 /100 WBCS
PLATELET # BLD AUTO: 211 THOUSANDS/UL (ref 149–390)
PMV BLD AUTO: 10.6 FL (ref 8.9–12.7)
POTASSIUM SERPL-SCNC: 4.1 MMOL/L (ref 3.5–5.3)
PROT SERPL-MCNC: 6.9 G/DL (ref 6.4–8.4)
RBC # BLD AUTO: 4.66 MILLION/UL (ref 3.81–5.12)
SODIUM SERPL-SCNC: 143 MMOL/L (ref 135–147)
TSH SERPL DL<=0.05 MIU/L-ACNC: 0.83 UIU/ML (ref 0.45–4.5)
WBC # BLD AUTO: 7.08 THOUSAND/UL (ref 4.31–10.16)

## 2025-02-21 PROCEDURE — 84443 ASSAY THYROID STIM HORMONE: CPT

## 2025-02-21 PROCEDURE — 99214 OFFICE O/P EST MOD 30 MIN: CPT

## 2025-02-21 PROCEDURE — 80053 COMPREHEN METABOLIC PANEL: CPT

## 2025-02-21 PROCEDURE — 85025 COMPLETE CBC W/AUTO DIFF WBC: CPT

## 2025-02-21 PROCEDURE — 93000 ELECTROCARDIOGRAM COMPLETE: CPT

## 2025-02-21 PROCEDURE — 84484 ASSAY OF TROPONIN QUANT: CPT

## 2025-02-21 PROCEDURE — 83880 ASSAY OF NATRIURETIC PEPTIDE: CPT

## 2025-02-21 PROCEDURE — 82306 VITAMIN D 25 HYDROXY: CPT

## 2025-02-21 PROCEDURE — 36415 COLL VENOUS BLD VENIPUNCTURE: CPT

## 2025-02-21 PROCEDURE — 85379 FIBRIN DEGRADATION QUANT: CPT

## 2025-02-21 RX ORDER — SERTRALINE HYDROCHLORIDE 25 MG/1
25 TABLET, FILM COATED ORAL DAILY
Qty: 30 TABLET | Refills: 5 | Status: SHIPPED | OUTPATIENT
Start: 2025-02-21 | End: 2025-08-20

## 2025-02-21 RX ORDER — FAMOTIDINE 40 MG/1
40 TABLET, FILM COATED ORAL DAILY
Qty: 30 TABLET | Refills: 1 | Status: SHIPPED | OUTPATIENT
Start: 2025-02-21

## 2025-02-21 NOTE — ASSESSMENT & PLAN NOTE
Will add Pepcid for belching follow-up in 2 weeks sooner call with any questions concerns side effects  Orders:  •  famotidine (PEPCID) 40 MG tablet; Take 1 tablet (40 mg total) by mouth daily

## 2025-02-21 NOTE — PROGRESS NOTES
Name: Mayte Lima      : 1970      MRN: [id number]  Encounter Provider: MIGUEL Werner  Encounter Date: 2025   Encounter department: [facility] [address]  :  Assessment & Plan  Chest pain, unspecified type  Left side chest pain, felt different from any kind of chest pain she has had in the past.  EKG normal sinus rhythm today.  Will do stress test echocardiogram also has palpitations will do Holter testing.  Mom needed a pacemaker in her 30s.  Will call with results of all testing.  Patient will start monitoring blood pressure at home goal of systolic less than 130 diastolic less than 90.  She will report back with blood pressures in 1 week.  Follow-up in 2 weeks or sooner if needed  Orders:  •  Echo complete w/ contrast if indicated; Future  •  Stress test only, exercise; Future  •  Holter monitor; Future  •  POCT ECG  •  High Sensitivity Troponin I Random; Future  •  B-Type Natriuretic Peptide(BNP); Future  •  D-dimer, quantitative; Future  •  XR chest pa and lateral; Future    Palpitations  Left side chest pain, felt different from any kind of chest pain she has had in the past.  EKG normal sinus rhythm today.  Will do stress test echocardiogram also has palpitations will do Holter testing.  Mom needed a pacemaker in her 30s.  Will call with results of all testing.  Patient will start monitoring blood pressure at home goal of systolic less than 130 diastolic less than 90.  She will report back with blood pressures in 1 week.  Follow-up in 2 weeks or sooner if needed  Orders:  •  Echo complete w/ contrast if indicated; Future  •  Stress test only, exercise; Future  •  Holter monitor; Future  •  POCT ECG  •  High Sensitivity Troponin I Random; Future  •  B-Type Natriuretic Peptide(BNP); Future    Snoring  Discussed with patient snoring witnessed apneas excessive daytime fatigue and palpitations.  Referral placed for sleep study.  Will call with  results  Orders:  •  Ambulatory Referral to Sleep Medicine; Future    Osteopenia, unspecified location  Will check vitamin D given handout on vitamin D and calcium supplementation  Orders:  •  Vitamin D 25 hydroxy; Future    Other fatigue  Discussed with patient snoring witnessed apneas excessive daytime fatigue that is affecting ADLs and palpitations.  Referral placed for sleep study.  Will call with results  Orders:  •  Ambulatory Referral to Sleep Medicine; Future    Cigarette nicotine dependence without complication  Patient has been smoking half a pack to a full pack for the last 40 years, will do CT lung cancer screening and call with results  Orders:  •  CT lung screening program; Future    Primary hypertension  Left side chest pain, felt different from any kind of chest pain she has had in the past.  EKG normal sinus rhythm today.  Will do stress test echocardiogram also has palpitations will do Holter testing.  Mom needed a pacemaker in her 30s.  Will call with results of all testing.  Patient will start monitoring blood pressure at home goal of systolic less than 130 diastolic less than 90.  She will report back with blood pressures in 1 week.  Follow-up in 2 weeks or sooner if needed  Orders:  •  CBC and differential; Future  •  Comprehensive metabolic panel; Future  •  TSH, 3rd generation with Free T4 reflex; Future  •  B-Type Natriuretic Peptide(BNP); Future    Obesity (BMI 30-39.9)  Will do fasting lab work and call with results    Orders:  •  CBC and differential; Future  •  Comprehensive metabolic panel; Future  •  TSH, 3rd generation with Free T4 reflex; Future  •  Vitamin D 25 hydroxy; Future  •  Ambulatory Referral to Sleep Medicine; Future    Severe major depressive disorder (HCC)  Patient would like to increase Zoloft to 75 mg daily.  Call with any questions side effects concerns follow-up in 2 weeks sooner if needed  Orders:  •  sertraline (ZOLOFT) 25 mg tablet; Take 1 tablet (25 mg total) by  "mouth daily    Gastroesophageal reflux disease without esophagitis  Will add Pepcid for belching follow-up in 2 weeks sooner call with any questions concerns side effects  Orders:  •  famotidine (PEPCID) 40 MG tablet; Take 1 tablet (40 mg total) by mouth daily           History of Present Illness   Mayte is here for headaches for the past 2 wks. She was also having chest pain on Wednesday. She also has chronic burping. Eyes fully checked yesterday and does need glasses.       Review of Systems   Constitutional:  Positive for fatigue. Negative for chills, diaphoresis and fever.   HENT:  Positive for congestion. Negative for ear pain, postnasal drip, rhinorrhea, sinus pressure, sinus pain and sore throat.    Respiratory:  Positive for cough, chest tightness and shortness of breath.    Cardiovascular:  Positive for chest pain and palpitations.   Gastrointestinal:  Positive for abdominal distention and nausea. Negative for abdominal pain, constipation, diarrhea and vomiting.   Musculoskeletal:  Positive for arthralgias, back pain and myalgias.   Neurological:  Positive for dizziness, light-headedness and headaches.   Psychiatric/Behavioral:  Positive for sleep disturbance. The patient is nervous/anxious.    All other systems reviewed and are negative.      Objective   /76 (BP Location: Left arm, Patient Position: Sitting)   Pulse 80   Resp 18   Ht 5' 1.3\" (1.557 m)   Wt 93.3 kg (205 lb 9.6 oz)   SpO2 98%   BMI 38.47 kg/m²      Physical Exam      "

## 2025-02-21 NOTE — ASSESSMENT & PLAN NOTE
Left side chest pain, felt different from any kind of chest pain she has had in the past.  EKG normal sinus rhythm today.  Will do stress test echocardiogram also has palpitations will do Holter testing.  Mom needed a pacemaker in her 30s.  Will call with results of all testing.  Patient will start monitoring blood pressure at home goal of systolic less than 130 diastolic less than 90.  She will report back with blood pressures in 1 week.  Follow-up in 2 weeks or sooner if needed  Orders:  •  CBC and differential; Future  •  Comprehensive metabolic panel; Future  •  TSH, 3rd generation with Free T4 reflex; Future  •  B-Type Natriuretic Peptide(BNP); Future

## 2025-02-21 NOTE — ASSESSMENT & PLAN NOTE
Patient would like to increase Zoloft to 75 mg daily.  Call with any questions side effects concerns follow-up in 2 weeks sooner if needed  Orders:  •  sertraline (ZOLOFT) 25 mg tablet; Take 1 tablet (25 mg total) by mouth daily

## 2025-02-22 DIAGNOSIS — K29.60 EROSIVE GASTRITIS: ICD-10-CM

## 2025-02-24 RX ORDER — PANTOPRAZOLE SODIUM 40 MG/1
40 TABLET, DELAYED RELEASE ORAL
Qty: 90 TABLET | Refills: 1 | Status: SHIPPED | OUTPATIENT
Start: 2025-02-24

## 2025-02-25 ENCOUNTER — TRANSCRIBE ORDERS (OUTPATIENT)
Dept: SLEEP CENTER | Facility: CLINIC | Age: 55
End: 2025-02-25

## 2025-02-25 DIAGNOSIS — E66.9 OBESITY (BMI 30-39.9): ICD-10-CM

## 2025-02-25 DIAGNOSIS — R06.83 SNORING: Primary | ICD-10-CM

## 2025-02-25 DIAGNOSIS — R53.83 OTHER FATIGUE: ICD-10-CM

## 2025-02-28 ENCOUNTER — HOSPITAL ENCOUNTER (OUTPATIENT)
Dept: ULTRASOUND IMAGING | Facility: HOSPITAL | Age: 55
End: 2025-02-28
Attending: STUDENT IN AN ORGANIZED HEALTH CARE EDUCATION/TRAINING PROGRAM
Payer: COMMERCIAL

## 2025-02-28 DIAGNOSIS — E04.2 MULTIPLE THYROID NODULES: ICD-10-CM

## 2025-02-28 PROCEDURE — 76536 US EXAM OF HEAD AND NECK: CPT

## 2025-03-03 ENCOUNTER — TELEPHONE (OUTPATIENT)
Age: 55
End: 2025-03-03

## 2025-03-03 NOTE — TELEPHONE ENCOUNTER
Pt called in to report her medication(famotidine (PEPCID) 40 MG tablet) is causing her more bloating and gas. Pt will also call in end of the week,with update on her blood pressures. PCP please call pt back to further review pts report of medication reactions.

## 2025-03-04 NOTE — TELEPHONE ENCOUNTER
Pt called back relayed message from Huy:      Can stop famotidine, how are her acid reflux symptoms     Pt states that she was very bloated over the weekend with burping and gas. However today she states it feels much better. She does not have any reflux symptoms currently. She wants to try the Famotidine till the end of the week and see if it was just something she ate over the weekend. She will call back on Thursday with a update.

## 2025-03-05 ENCOUNTER — RESULTS FOLLOW-UP (OUTPATIENT)
Dept: ENDOCRINOLOGY | Facility: CLINIC | Age: 55
End: 2025-03-05

## 2025-03-16 DIAGNOSIS — K21.9 GASTROESOPHAGEAL REFLUX DISEASE WITHOUT ESOPHAGITIS: ICD-10-CM

## 2025-03-16 DIAGNOSIS — F32.2 SEVERE MAJOR DEPRESSIVE DISORDER (HCC): ICD-10-CM

## 2025-03-18 ENCOUNTER — PATIENT MESSAGE (OUTPATIENT)
Dept: SLEEP CENTER | Facility: CLINIC | Age: 55
End: 2025-03-18

## 2025-03-18 RX ORDER — FAMOTIDINE 40 MG/1
40 TABLET, FILM COATED ORAL DAILY
Qty: 90 TABLET | Refills: 1 | Status: SHIPPED | OUTPATIENT
Start: 2025-03-18

## 2025-03-18 RX ORDER — SERTRALINE HYDROCHLORIDE 25 MG/1
25 TABLET, FILM COATED ORAL DAILY
Qty: 90 TABLET | Refills: 2 | Status: SHIPPED | OUTPATIENT
Start: 2025-03-18 | End: 2025-09-14

## 2025-03-26 NOTE — TELEPHONE ENCOUNTER
E/b fever, tachycardia, leukocytosis, hypotension requiring Vasopressor support. In setting of #2. Vasopressor management weaned off 3/20/25 by critical care team  -antibiotics as below  -follow-up cultures  -monitor temperature and hemodynamics  -serial exam  -additional inventions pending clinical course  -monitoring serial CBC and BMP for treatment response and any developing toxicities     Patient calling to ask Dr. Caruso to look at her thyroid imaging results. She wants to know if he wants to start her on the medication they talked about for graves disease. Please advise

## 2025-04-04 ENCOUNTER — HOSPITAL ENCOUNTER (OUTPATIENT)
Dept: NON INVASIVE DIAGNOSTICS | Facility: CLINIC | Age: 55
Discharge: HOME | End: 2025-04-04

## 2025-04-07 DIAGNOSIS — I10 PRIMARY HYPERTENSION: ICD-10-CM

## 2025-04-07 DIAGNOSIS — F41.1 GAD (GENERALIZED ANXIETY DISORDER): ICD-10-CM

## 2025-04-08 RX ORDER — HYDROCHLOROTHIAZIDE 25 MG/1
25 TABLET ORAL DAILY
Qty: 90 TABLET | Refills: 2 | Status: SHIPPED | OUTPATIENT
Start: 2025-04-08

## 2025-04-13 DIAGNOSIS — I10 PRIMARY HYPERTENSION: ICD-10-CM

## 2025-04-14 RX ORDER — LOSARTAN POTASSIUM 25 MG/1
25 TABLET ORAL DAILY
Qty: 90 TABLET | Refills: 1 | Status: SHIPPED | OUTPATIENT
Start: 2025-04-14

## 2025-06-10 ENCOUNTER — NURSE TRIAGE (OUTPATIENT)
Age: 55
End: 2025-06-10

## 2025-06-10 NOTE — TELEPHONE ENCOUNTER
Multiple attempts made to contact pt.  Multiple messages left on Voicemail.  Unsuccessful in making contact.   You can access the FollowMyHealth Patient Portal offered by [facility] by registering at the following website: [email or web address]. By joining [employer]’s FollowMyHealth portal, you will also be able to view your health information using other applications (apps) compatible with our system.

## 2025-06-10 NOTE — TELEPHONE ENCOUNTER
Regarding: High Blood Pressure  ----- Message from Milagros STAFFORD sent at 6/10/2025 10:34 AM EDT -----  The following was sent via [employer] message:    Good morning,  I haven't spoke to you in a while.  I hope your doing well.  I have been having headache.   I believe stress major stress headache plus I need to change my stress eating habits too.  It's Sunday 6/8/25 I took my blood pressure 2 times and it is 169 over 94. I know it's high.  I'm on blood pressure medication can I double up on it and still take and record my blood pressure everyday.  Then contact you or would you want me to come on and see you?  Thank you  Mayte Lima

## 2025-06-10 NOTE — TELEPHONE ENCOUNTER
"Patient called back -requesting a virtual visit due to personal schedule.  Informed patient it is best to be seen in the office.   The first patient is available is Friday 6/13/25. Appointment made for 1:20pm.    Instructed patient to take BP every day and keep BP log, heart healthy diet-patient states she is trying to change her eating habits-is under a lot of stress at home. Also instructed patient to call back if sx worsen .    Reason for Disposition   Systolic BP >= 180 OR Diastolic >= 110, and missed most recent dose of blood pressure medication    Answer Assessment - Initial Assessment Questions  1. BLOOD PRESSURE: \"What is your blood pressure?\" \"Did you take at least two measurements 5 minutes apart?\"      Took this morning 169/94  2. ONSET: \"When did you take your blood pressure?\"      This morning  3. HOW: \"How did you take your blood pressure?\" (e.g., automatic home BP monitor, visiting nurse)      Automatic arm cuff  4. HISTORY: \"Do you have a history of high blood pressure?\"      yes  5. MEDICINES: \"Are you taking any medicines for blood pressure?\" \"Have you missed any doses recently?\"      Losartan 25 mg and HCTZ 25 mg  6. OTHER SYMPTOMS: \"Do you have any symptoms?\" (e.g., blurred vision, chest pain, difficulty breathing, headache, weakness)      Headaches    Protocols used: Blood Pressure - High-Adult-OH    "

## 2025-06-13 ENCOUNTER — OFFICE VISIT (OUTPATIENT)
Dept: FAMILY MEDICINE CLINIC | Facility: CLINIC | Age: 55
End: 2025-06-13
Payer: COMMERCIAL

## 2025-06-13 VITALS
BODY MASS INDEX: 39.01 KG/M2 | TEMPERATURE: 98.7 F | HEART RATE: 99 BPM | WEIGHT: 206.6 LBS | OXYGEN SATURATION: 97 % | DIASTOLIC BLOOD PRESSURE: 82 MMHG | HEIGHT: 61 IN | SYSTOLIC BLOOD PRESSURE: 128 MMHG

## 2025-06-13 DIAGNOSIS — F41.1 GAD (GENERALIZED ANXIETY DISORDER): Primary | ICD-10-CM

## 2025-06-13 DIAGNOSIS — I10 PRIMARY HYPERTENSION: ICD-10-CM

## 2025-06-13 PROBLEM — S22.000A COMPRESSION FRACTURE OF THORACIC VERTEBRA (HCC): Status: RESOLVED | Noted: 2024-03-21 | Resolved: 2025-06-13

## 2025-06-13 PROCEDURE — 99214 OFFICE O/P EST MOD 30 MIN: CPT | Performed by: NURSE PRACTITIONER

## 2025-06-13 RX ORDER — SERTRALINE HYDROCHLORIDE 100 MG/1
100 TABLET, FILM COATED ORAL DAILY
Qty: 90 TABLET | Refills: 1 | Status: SHIPPED | OUTPATIENT
Start: 2025-06-13

## 2025-06-13 RX ORDER — LOSARTAN POTASSIUM 50 MG/1
50 TABLET ORAL DAILY
Qty: 90 TABLET | Refills: 1 | Status: SHIPPED | OUTPATIENT
Start: 2025-06-13

## 2025-06-13 NOTE — ASSESSMENT & PLAN NOTE
Repeat blood pressure is 130/90.  Will increase losartan to 50 mg daily.  Patient will bring in her cuff next week to correlate with manual cuff.  She will return to office in 2 weeks.  Orders:    losartan (COZAAR) 50 mg tablet; Take 1 tablet (50 mg total) by mouth in the morning

## 2025-06-13 NOTE — ASSESSMENT & PLAN NOTE
Will increase zoloft to 100mg daily. Will return in two weeks for follow up.   Orders:    sertraline (ZOLOFT) 100 mg tablet; Take 1 tablet (100 mg total) by mouth daily

## 2025-06-13 NOTE — PROGRESS NOTES
"Name: Mayte Lima      : 1970      MRN: [id number]  Encounter Provider: MIGUEL Roman  Encounter Date: 2025   Encounter department: [facility] [address]  :  Assessment & Plan  SIMON (generalized anxiety disorder)  Will increase zoloft to 100mg daily. Will return in two weeks for follow up.   Orders:    sertraline (ZOLOFT) 100 mg tablet; Take 1 tablet (100 mg total) by mouth daily    Primary hypertension  Repeat blood pressure is 130/90.  Will increase losartan to 50 mg daily.  Patient will bring in her cuff next week to correlate with manual cuff.  She will return to office in 2 weeks.  Orders:    losartan (COZAAR) 50 mg tablet; Take 1 tablet (50 mg total) by mouth in the morning           History of Present Illness   Patient presents for concerns of high bp at home, 174/103 then 174/100. Very stressed, headaches, excedrin increase/stress. Her mother and father are both in hospice currently.       Review of Systems   Constitutional:  Negative for chills and fever.   HENT:  Negative for ear pain and sore throat.    Eyes:  Negative for pain and visual disturbance.   Respiratory:  Negative for cough and shortness of breath.    Cardiovascular:  Positive for palpitations (wtih anxiety). Negative for chest pain.   Gastrointestinal:  Negative for abdominal pain and vomiting.   Genitourinary:  Negative for dysuria and hematuria.   Musculoskeletal:  Negative for arthralgias and back pain.   Skin:  Negative for color change and rash.   Neurological:  Positive for dizziness, light-headedness and headaches. Negative for seizures and syncope.   Psychiatric/Behavioral:  The patient is nervous/anxious.    All other systems reviewed and are negative.      Objective   /82 (BP Location: Left arm, Patient Position: Sitting)   Pulse 99   Temp 98.7 °F (37.1 °C)   Ht 5' 1\" (1.549 m)   Wt 93.7 kg (206 lb 9.6 oz)   SpO2 97%   BMI 39.04 kg/m²      Physical Exam  Vitals " and nursing note reviewed.   Constitutional:       General: She is not in acute distress.     Appearance: She is well-developed.   HENT:      Head: Normocephalic and atraumatic.     Cardiovascular:      Rate and Rhythm: Normal rate and regular rhythm.      Heart sounds: No murmur heard.  Pulmonary:      Effort: Pulmonary effort is normal. No respiratory distress.      Breath sounds: Normal breath sounds.     Musculoskeletal:         General: No swelling.      Cervical back: Neck supple.     Skin:     General: Skin is warm and dry.      Capillary Refill: Capillary refill takes less than 2 seconds.     Neurological:      Mental Status: She is alert and oriented to person, place, and time.     Psychiatric:         Mood and Affect: Mood normal.

## 2025-07-21 DIAGNOSIS — K21.9 GASTROESOPHAGEAL REFLUX DISEASE WITHOUT ESOPHAGITIS: ICD-10-CM

## 2025-07-23 RX ORDER — FAMOTIDINE 40 MG/1
40 TABLET, FILM COATED ORAL DAILY
Qty: 90 TABLET | Refills: 1 | Status: SHIPPED | OUTPATIENT
Start: 2025-07-23

## 2025-07-31 ENCOUNTER — OFFICE VISIT (OUTPATIENT)
Dept: FAMILY MEDICINE CLINIC | Facility: CLINIC | Age: 55
End: 2025-07-31
Payer: COMMERCIAL

## 2025-07-31 ENCOUNTER — HOSPITAL ENCOUNTER (OUTPATIENT)
Dept: RADIOLOGY | Facility: HOSPITAL | Age: 55
Discharge: HOME | End: 2025-07-31
Payer: COMMERCIAL

## 2025-07-31 VITALS
HEART RATE: 80 BPM | DIASTOLIC BLOOD PRESSURE: 80 MMHG | SYSTOLIC BLOOD PRESSURE: 124 MMHG | OXYGEN SATURATION: 98 % | BODY MASS INDEX: 40.1 KG/M2 | RESPIRATION RATE: 18 BRPM | TEMPERATURE: 97.8 F | WEIGHT: 212.4 LBS | HEIGHT: 61 IN

## 2025-07-31 DIAGNOSIS — M92.62 HAGLUND DEFORMITY OF LEFT HEEL: ICD-10-CM

## 2025-07-31 DIAGNOSIS — F17.200 TOBACCO USE DISORDER: ICD-10-CM

## 2025-07-31 DIAGNOSIS — R35.0 URINARY FREQUENCY: ICD-10-CM

## 2025-07-31 DIAGNOSIS — J40 BRONCHITIS: Primary | ICD-10-CM

## 2025-07-31 DIAGNOSIS — I10 PRIMARY HYPERTENSION: ICD-10-CM

## 2025-07-31 LAB
SL AMB  POCT GLUCOSE, UA: ABNORMAL
SL AMB LEUKOCYTE ESTERASE,UA: ABNORMAL
SL AMB POCT BILIRUBIN,UA: 17
SL AMB POCT BLOOD,UA: 80
SL AMB POCT CLARITY,UA: ABNORMAL
SL AMB POCT COLOR,UA: YELLOW
SL AMB POCT KETONES,UA: ABNORMAL
SL AMB POCT NITRITE,UA: ABNORMAL
SL AMB POCT PH,UA: 6
SL AMB POCT SPECIFIC GRAVITY,UA: 1.02
SL AMB POCT URINE PROTEIN: ABNORMAL
SL AMB POCT UROBILINOGEN: 3.5

## 2025-07-31 PROCEDURE — 87086 URINE CULTURE/COLONY COUNT: CPT | Performed by: NURSE PRACTITIONER

## 2025-07-31 PROCEDURE — 81002 URINALYSIS NONAUTO W/O SCOPE: CPT | Performed by: NURSE PRACTITIONER

## 2025-07-31 PROCEDURE — 73630 X-RAY EXAM OF FOOT: CPT

## 2025-07-31 PROCEDURE — 99214 OFFICE O/P EST MOD 30 MIN: CPT | Performed by: NURSE PRACTITIONER

## 2025-07-31 RX ORDER — DEXTROMETHORPHAN HYDROBROMIDE AND PROMETHAZINE HYDROCHLORIDE 15; 6.25 MG/5ML; MG/5ML
5 SYRUP ORAL 4 TIMES DAILY PRN
Qty: 118 ML | Refills: 0 | Status: SHIPPED | OUTPATIENT
Start: 2025-07-31

## 2025-07-31 RX ORDER — AZITHROMYCIN 250 MG/1
TABLET, FILM COATED ORAL
Qty: 6 TABLET | Refills: 0 | Status: SHIPPED | OUTPATIENT
Start: 2025-07-31 | End: 2025-08-05

## 2025-07-31 RX ORDER — FLUTICASONE PROPIONATE 50 MCG
1 SPRAY, SUSPENSION (ML) NASAL DAILY
Qty: 16 G | Refills: 0 | Status: SHIPPED | OUTPATIENT
Start: 2025-07-31

## 2025-07-31 RX ORDER — HYDROCHLOROTHIAZIDE 25 MG/1
25 TABLET ORAL DAILY
Qty: 90 TABLET | Refills: 2 | Status: SHIPPED | OUTPATIENT
Start: 2025-07-31

## 2025-08-01 LAB — BACTERIA UR CULT: NORMAL

## 2025-08-04 ENCOUNTER — TELEPHONE (OUTPATIENT)
Age: 55
End: 2025-08-04

## 2025-08-12 ENCOUNTER — NURSE TRIAGE (OUTPATIENT)
Age: 55
End: 2025-08-12